# Patient Record
Sex: MALE | Race: WHITE | NOT HISPANIC OR LATINO | Employment: UNEMPLOYED | URBAN - METROPOLITAN AREA
[De-identification: names, ages, dates, MRNs, and addresses within clinical notes are randomized per-mention and may not be internally consistent; named-entity substitution may affect disease eponyms.]

---

## 2017-09-12 ENCOUNTER — TREATMENT (OUTPATIENT)
Dept: PHYSICAL THERAPY | Facility: CLINIC | Age: 37
End: 2017-09-12

## 2017-09-12 DIAGNOSIS — Z02.1 DRUG SCREENING, PRE-EMPLOYMENT: Primary | ICD-10-CM

## 2017-09-12 PROCEDURE — PDS: Performed by: PHYSICAL THERAPIST

## 2019-10-17 ENCOUNTER — HOSPITAL ENCOUNTER (OUTPATIENT)
Dept: OTHER | Facility: HOSPITAL | Age: 39
Discharge: HOME | End: 2019-10-17
Attending: NURSE PRACTITIONER

## 2019-10-17 LAB
CK MB CFR SERPL ELPH: 1.2 NG/ML (ref 1–3.8)
TROPONIN T SERPL-MCNC: <0.01 NG/ML (ref 0–0.1)

## 2020-04-16 ENCOUNTER — HOSPITAL ENCOUNTER (OUTPATIENT)
Dept: GENERAL RADIOLOGY | Facility: HOSPITAL | Age: 40
Discharge: HOME | End: 2020-04-16
Attending: INTERNAL MEDICINE

## 2020-04-16 ENCOUNTER — HOSPITAL ENCOUNTER (OUTPATIENT)
Dept: LAB | Facility: HOSPITAL | Age: 40
Discharge: HOME | End: 2020-04-16
Attending: INTERNAL MEDICINE

## 2020-04-16 LAB
ALBUMIN SERPL-MCNC: 4.2 G/DL (ref 3.5–5)
ALBUMIN/GLOB SERPL: 1.2 {RATIO} (ref 1.4–2.6)
ALP SERPL-CCNC: 74 U/L (ref 53–128)
ALT SERPL-CCNC: 145 U/L (ref 10–40)
ANION GAP SERPL CALC-SCNC: 16 MMOL/L (ref 8–19)
AST SERPL-CCNC: 109 U/L (ref 15–50)
BASOPHILS # BLD AUTO: 0.08 10*3/UL (ref 0–0.2)
BASOPHILS NFR BLD AUTO: 0.9 % (ref 0–3)
BILIRUB SERPL-MCNC: 0.42 MG/DL (ref 0.2–1.3)
BUN SERPL-MCNC: 11 MG/DL (ref 5–25)
BUN/CREAT SERPL: 10 {RATIO} (ref 6–20)
CALCIUM SERPL-MCNC: 9.3 MG/DL (ref 8.7–10.4)
CHLORIDE SERPL-SCNC: 103 MMOL/L (ref 99–111)
CONV ABS IMM GRAN: 0.18 10*3/UL (ref 0–0.2)
CONV CO2: 23 MMOL/L (ref 22–32)
CONV IMMATURE GRAN: 2 % (ref 0–1.8)
CONV TOTAL PROTEIN: 7.7 G/DL (ref 6.3–8.2)
CREAT UR-MCNC: 1.15 MG/DL (ref 0.7–1.2)
DEPRECATED RDW RBC AUTO: 43 FL (ref 35.1–43.9)
EOSINOPHIL # BLD AUTO: 0.18 10*3/UL (ref 0–0.7)
EOSINOPHIL # BLD AUTO: 2 % (ref 0–7)
ERYTHROCYTE [DISTWIDTH] IN BLOOD BY AUTOMATED COUNT: 12.3 % (ref 11.6–14.4)
ERYTHROCYTE [SEDIMENTATION RATE] IN BLOOD: 14 MM/H (ref 0–20)
FERRITIN SERPL-MCNC: 547 NG/ML (ref 30–300)
GFR SERPLBLD BASED ON 1.73 SQ M-ARVRAT: >60 ML/MIN/{1.73_M2}
GLOBULIN UR ELPH-MCNC: 3.5 G/DL (ref 2–3.5)
GLUCOSE SERPL-MCNC: 94 MG/DL (ref 70–99)
HCT VFR BLD AUTO: 45 % (ref 42–52)
HGB BLD-MCNC: 15.1 G/DL (ref 14–18)
LYMPHOCYTES # BLD AUTO: 2.14 10*3/UL (ref 1–5)
LYMPHOCYTES NFR BLD AUTO: 24.3 % (ref 20–45)
MCH RBC QN AUTO: 31.7 PG (ref 27–31)
MCHC RBC AUTO-ENTMCNC: 33.6 G/DL (ref 33–37)
MCV RBC AUTO: 94.3 FL (ref 80–96)
MONOCYTES # BLD AUTO: 1.06 10*3/UL (ref 0.2–1.2)
MONOCYTES NFR BLD AUTO: 12 % (ref 3–10)
NEUTROPHILS # BLD AUTO: 5.17 10*3/UL (ref 2–8)
NEUTROPHILS NFR BLD AUTO: 58.8 % (ref 30–85)
NRBC CBCN: 0 % (ref 0–0.7)
OSMOLALITY SERPL CALC.SUM OF ELEC: 285 MOSM/KG (ref 273–304)
PLATELET # BLD AUTO: 324 10*3/UL (ref 130–400)
PMV BLD AUTO: 10.8 FL (ref 9.4–12.4)
POTASSIUM SERPL-SCNC: 4 MMOL/L (ref 3.5–5.3)
RBC # BLD AUTO: 4.77 10*6/UL (ref 4.7–6.1)
SODIUM SERPL-SCNC: 138 MMOL/L (ref 135–147)
WBC # BLD AUTO: 8.81 10*3/UL (ref 4.8–10.8)

## 2020-04-20 LAB
ASO AB SERPL-ACNC: 189 [IU]/ML (ref 0–200)
CONV RHEUMATOID FACTOR IGM: <10 [IU]/ML (ref 0–14)
CRP SERPL-MCNC: 3.8 MG/L (ref 0–5)
DSDNA AB SER-ACNC: NEGATIVE [IU]/ML
ENA AB SER IA-ACNC: NEGATIVE {RATIO}
URATE SERPL-MCNC: 7.8 MG/DL (ref 3.5–8.5)

## 2020-04-23 LAB
CONV HLA-B27 GENOTYPING (PCR): NEGATIVE
CONV HLA-B27 PCR SPECIMEN: NORMAL

## 2020-10-13 ENCOUNTER — HOSPITAL ENCOUNTER (OUTPATIENT)
Dept: URGENT CARE | Facility: CLINIC | Age: 40
Discharge: HOME | End: 2020-10-13
Attending: FAMILY MEDICINE

## 2020-10-17 LAB — SARS-COV-2 RNA SPEC QL NAA+PROBE: NOT DETECTED

## 2020-10-21 ENCOUNTER — HOSPITAL ENCOUNTER (OUTPATIENT)
Dept: URGENT CARE | Facility: CLINIC | Age: 40
Discharge: HOME | End: 2020-10-21
Attending: FAMILY MEDICINE

## 2020-10-23 LAB — SARS-COV-2 RNA SPEC QL NAA+PROBE: NOT DETECTED

## 2021-01-01 ENCOUNTER — APPOINTMENT (OUTPATIENT)
Dept: INTERVENTIONAL RADIOLOGY/VASCULAR | Facility: HOSPITAL | Age: 41
DRG: 432 | End: 2021-01-01
Payer: MEDICAID

## 2021-01-01 ENCOUNTER — OFFICE VISIT (OUTPATIENT)
Dept: CARDIOLOGY | Facility: CLINIC | Age: 41
End: 2021-01-01

## 2021-01-01 ENCOUNTER — ANESTHESIA (OUTPATIENT)
Dept: GASTROENTEROLOGY | Facility: HOSPITAL | Age: 41
DRG: 432 | End: 2021-01-01
Payer: MEDICAID

## 2021-01-01 ENCOUNTER — CONVERSION ENCOUNTER (OUTPATIENT)
Dept: ORTHOPEDIC SURGERY | Facility: CLINIC | Age: 41
End: 2021-01-01

## 2021-01-01 ENCOUNTER — OFFICE VISIT CONVERTED (OUTPATIENT)
Dept: ORTHOPEDIC SURGERY | Facility: CLINIC | Age: 41
End: 2021-01-01
Attending: ORTHOPAEDIC SURGERY

## 2021-01-01 ENCOUNTER — APPOINTMENT (OUTPATIENT)
Dept: CT IMAGING | Facility: HOSPITAL | Age: 41
End: 2021-01-01

## 2021-01-01 ENCOUNTER — HOSPITAL ENCOUNTER (EMERGENCY)
Facility: HOSPITAL | Age: 41
Discharge: HOME | End: 2021-09-02
Attending: EMERGENCY MEDICINE | Admitting: EMERGENCY MEDICINE

## 2021-01-01 ENCOUNTER — APPOINTMENT (OUTPATIENT)
Dept: GENERAL RADIOLOGY | Facility: HOSPITAL | Age: 41
End: 2021-01-01

## 2021-01-01 ENCOUNTER — PREP FOR SURGERY (OUTPATIENT)
Dept: OTHER | Facility: HOSPITAL | Age: 41
End: 2021-01-01

## 2021-01-01 ENCOUNTER — APPOINTMENT (OUTPATIENT)
Dept: GENERAL RADIOLOGY | Facility: HOSPITAL | Age: 41
DRG: 432 | End: 2021-01-01
Payer: MEDICAID

## 2021-01-01 ENCOUNTER — APPOINTMENT (OUTPATIENT)
Dept: CARDIOLOGY | Facility: HOSPITAL | Age: 41
End: 2021-01-01

## 2021-01-01 ENCOUNTER — READMISSION MANAGEMENT (OUTPATIENT)
Dept: CALL CENTER | Facility: HOSPITAL | Age: 41
End: 2021-01-01

## 2021-01-01 ENCOUNTER — HOSPITAL ENCOUNTER (OUTPATIENT)
Dept: URGENT CARE | Facility: CLINIC | Age: 41
Discharge: HOME | End: 2021-04-18
Attending: PHYSICIAN ASSISTANT

## 2021-01-01 ENCOUNTER — HOSPITAL ENCOUNTER (INPATIENT)
Facility: HOSPITAL | Age: 41
LOS: 4 days | End: 2021-11-23
Attending: EMERGENCY MEDICINE | Admitting: HOSPITALIST

## 2021-01-01 ENCOUNTER — HOSPITAL ENCOUNTER (EMERGENCY)
Facility: HOSPITAL | Age: 41
Discharge: HOME | End: 2021-08-20
Attending: EMERGENCY MEDICINE | Admitting: EMERGENCY MEDICINE

## 2021-01-01 ENCOUNTER — ANESTHESIA EVENT (OUTPATIENT)
Dept: GASTROENTEROLOGY | Facility: HOSPITAL | Age: 41
DRG: 432 | End: 2021-01-01
Payer: MEDICAID

## 2021-01-01 ENCOUNTER — APPOINTMENT (OUTPATIENT)
Dept: NEUROLOGY | Facility: HOSPITAL | Age: 41
End: 2021-01-01

## 2021-01-01 ENCOUNTER — APPOINTMENT (OUTPATIENT)
Dept: CARDIOLOGY | Facility: HOSPITAL | Age: 41
DRG: 432 | End: 2021-01-01
Payer: MEDICAID

## 2021-01-01 ENCOUNTER — HOSPITAL ENCOUNTER (OUTPATIENT)
Dept: PREADMISSION TESTING | Facility: HOSPITAL | Age: 41
Discharge: HOME | End: 2021-04-01
Attending: ORTHOPAEDIC SURGERY

## 2021-01-01 ENCOUNTER — APPOINTMENT (OUTPATIENT)
Dept: ULTRASOUND IMAGING | Facility: HOSPITAL | Age: 41
End: 2021-01-01

## 2021-01-01 ENCOUNTER — APPOINTMENT (OUTPATIENT)
Dept: MRI IMAGING | Facility: HOSPITAL | Age: 41
DRG: 432 | End: 2021-01-01
Payer: MEDICAID

## 2021-01-01 ENCOUNTER — HOSPITAL ENCOUNTER (INPATIENT)
Facility: HOSPITAL | Age: 41
LOS: 4 days | Discharge: HOME | DRG: 432 | End: 2021-11-16
Attending: EMERGENCY MEDICINE | Admitting: INTERNAL MEDICINE
Payer: MEDICAID

## 2021-01-01 ENCOUNTER — APPOINTMENT (OUTPATIENT)
Dept: CT IMAGING | Facility: HOSPITAL | Age: 41
DRG: 432 | End: 2021-01-01
Payer: MEDICAID

## 2021-01-01 ENCOUNTER — APPOINTMENT (OUTPATIENT)
Dept: INTERVENTIONAL RADIOLOGY/VASCULAR | Facility: HOSPITAL | Age: 41
End: 2021-01-01

## 2021-01-01 ENCOUNTER — TRANSITIONAL CARE MANAGEMENT TELEPHONE ENCOUNTER (OUTPATIENT)
Dept: CALL CENTER | Facility: HOSPITAL | Age: 41
End: 2021-01-01

## 2021-01-01 VITALS
SYSTOLIC BLOOD PRESSURE: 140 MMHG | WEIGHT: 229.72 LBS | BODY MASS INDEX: 36.06 KG/M2 | RESPIRATION RATE: 18 BRPM | HEART RATE: 91 BPM | HEIGHT: 67 IN | OXYGEN SATURATION: 91 % | TEMPERATURE: 97.3 F | DIASTOLIC BLOOD PRESSURE: 95 MMHG

## 2021-01-01 VITALS
DIASTOLIC BLOOD PRESSURE: 71 MMHG | BODY MASS INDEX: 42.21 KG/M2 | HEIGHT: 67 IN | SYSTOLIC BLOOD PRESSURE: 156 MMHG | TEMPERATURE: 97.6 F | WEIGHT: 268.96 LBS

## 2021-01-01 VITALS
TEMPERATURE: 99.1 F | SYSTOLIC BLOOD PRESSURE: 146 MMHG | BODY MASS INDEX: 37.02 KG/M2 | OXYGEN SATURATION: 98 % | HEART RATE: 90 BPM | HEIGHT: 67 IN | WEIGHT: 235.89 LBS | RESPIRATION RATE: 18 BRPM | DIASTOLIC BLOOD PRESSURE: 90 MMHG

## 2021-01-01 VITALS
SYSTOLIC BLOOD PRESSURE: 149 MMHG | BODY MASS INDEX: 35.16 KG/M2 | HEIGHT: 67 IN | DIASTOLIC BLOOD PRESSURE: 94 MMHG | WEIGHT: 224 LBS | HEART RATE: 119 BPM

## 2021-01-01 VITALS — BODY MASS INDEX: 31.7 KG/M2 | HEIGHT: 69 IN | WEIGHT: 214 LBS

## 2021-01-01 VITALS
HEART RATE: 110 BPM | HEIGHT: 67 IN | SYSTOLIC BLOOD PRESSURE: 108 MMHG | TEMPERATURE: 99 F | BODY MASS INDEX: 41.97 KG/M2 | RESPIRATION RATE: 20 BRPM | WEIGHT: 267.42 LBS | OXYGEN SATURATION: 96 % | DIASTOLIC BLOOD PRESSURE: 88 MMHG

## 2021-01-01 VITALS — HEIGHT: 69 IN | WEIGHT: 214 LBS | BODY MASS INDEX: 31.7 KG/M2

## 2021-01-01 VITALS — OXYGEN SATURATION: 98 % | BODY MASS INDEX: 31.75 KG/M2 | WEIGHT: 214.37 LBS | HEIGHT: 69 IN | HEART RATE: 98 BPM

## 2021-01-01 DIAGNOSIS — R41.0 DELIRIUM: ICD-10-CM

## 2021-01-01 DIAGNOSIS — R19.7 DIARRHEA, UNSPECIFIED TYPE: ICD-10-CM

## 2021-01-01 DIAGNOSIS — I42.9 CARDIOMYOPATHY, UNSPECIFIED TYPE (HCC): Primary | ICD-10-CM

## 2021-01-01 DIAGNOSIS — K70.10 ALCOHOLIC HEPATITIS WITHOUT ASCITES: Primary | ICD-10-CM

## 2021-01-01 DIAGNOSIS — R10.9 ABDOMINAL PAIN, UNSPECIFIED ABDOMINAL LOCATION: ICD-10-CM

## 2021-01-01 DIAGNOSIS — K70.10 ALCOHOLIC HEPATITIS WITHOUT ASCITES: ICD-10-CM

## 2021-01-01 DIAGNOSIS — K76.9 LIVER DISEASE: ICD-10-CM

## 2021-01-01 DIAGNOSIS — N50.89 SCROTAL EDEMA: Primary | ICD-10-CM

## 2021-01-01 DIAGNOSIS — N17.9 ACUTE RENAL FAILURE, UNSPECIFIED ACUTE RENAL FAILURE TYPE: Primary | ICD-10-CM

## 2021-01-01 DIAGNOSIS — I86.1 VARICOCELE PRESENT ON ULTRASOUND OF SCROTUM: ICD-10-CM

## 2021-01-01 DIAGNOSIS — F10.20 ALCOHOLISM: ICD-10-CM

## 2021-01-01 DIAGNOSIS — I10 ESSENTIAL HYPERTENSION: ICD-10-CM

## 2021-01-01 DIAGNOSIS — N43.3 HYDROCELE, UNSPECIFIED HYDROCELE TYPE: ICD-10-CM

## 2021-01-01 DIAGNOSIS — R10.9 RIGHT FLANK PAIN: ICD-10-CM

## 2021-01-01 DIAGNOSIS — N39.0 URINARY TRACT INFECTION IN MALE: Primary | ICD-10-CM

## 2021-01-01 DIAGNOSIS — R60.1 ANASARCA: ICD-10-CM

## 2021-01-01 DIAGNOSIS — E66.9 OBESITY (BMI 35.0-39.9 WITHOUT COMORBIDITY): ICD-10-CM

## 2021-01-01 DIAGNOSIS — J18.9 MULTIFOCAL PNEUMONIA: ICD-10-CM

## 2021-01-01 DIAGNOSIS — J96.01 ACUTE RESPIRATORY FAILURE WITH HYPOXIA: Primary | ICD-10-CM

## 2021-01-01 LAB
ALBUMIN SERPL-MCNC: 2.7 G/DL (ref 3.5–5.2)
ALBUMIN SERPL-MCNC: 2.8 G/DL (ref 3.5–5.2)
ALBUMIN SERPL-MCNC: 2.9 G/DL (ref 3.5–5.2)
ALBUMIN SERPL-MCNC: 2.9 G/DL (ref 3.5–5.2)
ALBUMIN SERPL-MCNC: 3 G/DL (ref 3.5–5.2)
ALBUMIN SERPL-MCNC: 3 G/DL (ref 3.5–5.2)
ALBUMIN SERPL-MCNC: 3.6 G/DL (ref 3.5–5.2)
ALBUMIN/GLOB SERPL: 0.5 G/DL
ALBUMIN/GLOB SERPL: 0.6 G/DL
ALBUMIN/GLOB SERPL: 0.8 G/DL
ALP SERPL-CCNC: 179 U/L (ref 39–117)
ALP SERPL-CCNC: 190 U/L (ref 39–117)
ALP SERPL-CCNC: 194 U/L (ref 39–117)
ALP SERPL-CCNC: 201 U/L (ref 39–117)
ALP SERPL-CCNC: 201 U/L (ref 39–117)
ALP SERPL-CCNC: 216 U/L (ref 39–117)
ALP SERPL-CCNC: 225 U/L (ref 39–117)
ALP SERPL-CCNC: 230 U/L (ref 39–117)
ALP SERPL-CCNC: 236 U/L (ref 39–117)
ALP SERPL-CCNC: 249 U/L (ref 39–117)
ALP SERPL-CCNC: 269 U/L (ref 39–117)
ALP SERPL-CCNC: 305 U/L (ref 39–117)
ALT SERPL W P-5'-P-CCNC: 20 U/L (ref 1–41)
ALT SERPL W P-5'-P-CCNC: 21 U/L (ref 1–41)
ALT SERPL W P-5'-P-CCNC: 22 U/L (ref 1–41)
ALT SERPL W P-5'-P-CCNC: 23 U/L (ref 1–41)
ALT SERPL W P-5'-P-CCNC: 23 U/L (ref 1–41)
ALT SERPL W P-5'-P-CCNC: 25 U/L (ref 1–41)
ALT SERPL W P-5'-P-CCNC: 27 U/L (ref 1–41)
ALT SERPL W P-5'-P-CCNC: 28 U/L (ref 1–41)
ALT SERPL W P-5'-P-CCNC: 29 U/L (ref 1–41)
ALT SERPL W P-5'-P-CCNC: 45 U/L (ref 1–41)
AMMONIA BLD-SCNC: 106 UMOL/L (ref 16–60)
AMMONIA BLD-SCNC: 22 UMOL/L (ref 16–60)
AMMONIA BLD-SCNC: 57 UMOL/L (ref 16–60)
AMMONIA BLD-SCNC: 91 UMOL/L (ref 16–60)
AMPHET+METHAMPHET UR QL: NEGATIVE
ANION GAP SERPL CALCULATED.3IONS-SCNC: 10.4 MMOL/L (ref 5–15)
ANION GAP SERPL CALCULATED.3IONS-SCNC: 10.7 MMOL/L (ref 5–15)
ANION GAP SERPL CALCULATED.3IONS-SCNC: 10.8 MMOL/L (ref 5–15)
ANION GAP SERPL CALCULATED.3IONS-SCNC: 11 MMOL/L (ref 5–15)
ANION GAP SERPL CALCULATED.3IONS-SCNC: 11.2 MMOL/L (ref 5–15)
ANION GAP SERPL CALCULATED.3IONS-SCNC: 12.1 MMOL/L (ref 5–15)
ANION GAP SERPL CALCULATED.3IONS-SCNC: 12.3 MMOL/L (ref 5–15)
ANION GAP SERPL CALCULATED.3IONS-SCNC: 13.4 MMOL/L (ref 5–15)
ANION GAP SERPL CALCULATED.3IONS-SCNC: 13.7 MMOL/L (ref 5–15)
ANION GAP SERPL CALCULATED.3IONS-SCNC: 14 MMOL/L (ref 5–15)
ANION GAP SERPL CALCULATED.3IONS-SCNC: 15.4 MMOL/L (ref 5–15)
ANION GAP SERPL CALCULATED.3IONS-SCNC: 17.2 MMOL/L (ref 5–15)
ANION GAP SERPL CALCULATED.3IONS-SCNC: 19.1 MMOL/L (ref 5–15)
ANION GAP SERPL CALCULATED.3IONS-SCNC: 19.5 MMOL/L (ref 5–15)
ANISOCYTOSIS BLD QL: ABNORMAL
ANISOCYTOSIS BLD QL: NORMAL
ARTERIAL PATENCY WRIST A: ABNORMAL
ARTERIAL PATENCY WRIST A: ABNORMAL
ARTERIAL PATENCY WRIST A: POSITIVE
AST SERPL-CCNC: 141 U/L (ref 1–40)
AST SERPL-CCNC: 152 U/L (ref 1–40)
AST SERPL-CCNC: 160 U/L (ref 1–40)
AST SERPL-CCNC: 173 U/L (ref 1–40)
AST SERPL-CCNC: 173 U/L (ref 1–40)
AST SERPL-CCNC: 182 U/L (ref 1–40)
AST SERPL-CCNC: 182 U/L (ref 1–40)
AST SERPL-CCNC: 185 U/L (ref 1–40)
AST SERPL-CCNC: 185 U/L (ref 1–40)
AST SERPL-CCNC: 187 U/L (ref 1–40)
AST SERPL-CCNC: 189 U/L (ref 1–40)
AST SERPL-CCNC: 207 U/L (ref 1–40)
BACTERIA SPEC AEROBE CULT: NORMAL
BACTERIA SPEC AEROBE CULT: NORMAL
BACTERIA UR QL AUTO: ABNORMAL /HPF
BACTERIA UR QL AUTO: ABNORMAL /HPF
BARBITURATES UR QL SCN: NEGATIVE
BASE EXCESS BLDA CALC-SCNC: -1.9 MMOL/L (ref -2–2)
BASE EXCESS BLDA CALC-SCNC: -3.3 MMOL/L (ref -2–2)
BASE EXCESS BLDA CALC-SCNC: -8.3 MMOL/L (ref -2–2)
BASOPHILS # BLD AUTO: 0.03 10*3/MM3 (ref 0–0.2)
BASOPHILS # BLD AUTO: 0.04 10*3/MM3 (ref 0–0.2)
BASOPHILS # BLD AUTO: 0.04 10*3/MM3 (ref 0–0.2)
BASOPHILS # BLD AUTO: 0.05 10*3/MM3 (ref 0–0.2)
BASOPHILS # BLD AUTO: 0.05 10*3/MM3 (ref 0–0.2)
BASOPHILS # BLD AUTO: 0.06 10*3/MM3 (ref 0–0.2)
BASOPHILS # BLD AUTO: 0.06 10*3/MM3 (ref 0–0.2)
BASOPHILS # BLD AUTO: 0.07 10*3/MM3 (ref 0–0.2)
BASOPHILS NFR BLD AUTO: 0.3 % (ref 0–1.5)
BASOPHILS NFR BLD AUTO: 0.3 % (ref 0–1.5)
BASOPHILS NFR BLD AUTO: 0.4 % (ref 0–1.5)
BASOPHILS NFR BLD AUTO: 0.4 % (ref 0–1.5)
BASOPHILS NFR BLD AUTO: 0.5 % (ref 0–1.5)
BASOPHILS NFR BLD AUTO: 0.6 % (ref 0–1.5)
BASOPHILS NFR BLD AUTO: 0.7 % (ref 0–1.5)
BDY SITE: ABNORMAL
BENZODIAZ UR QL SCN: NEGATIVE
BH CV ECHO MEAS - AO ROOT DIAM: 3.7 CM
BH CV ECHO MEAS - EDV(MOD-SP2): 124 ML
BH CV ECHO MEAS - EDV(MOD-SP2): 94 ML
BH CV ECHO MEAS - EDV(MOD-SP4): 122 ML
BH CV ECHO MEAS - EDV(MOD-SP4): 95 ML
BH CV ECHO MEAS - EF(MOD-BP): 61.3 %
BH CV ECHO MEAS - EF(MOD-BP): 64.6 %
BH CV ECHO MEAS - ESV(MOD-SP2): 37 ML
BH CV ECHO MEAS - ESV(MOD-SP2): 44 ML
BH CV ECHO MEAS - ESV(MOD-SP4): 3 ML
BH CV ECHO MEAS - ESV(MOD-SP4): 37 ML
BH CV ECHO MEAS - IVSD: 0.9 CM
BH CV ECHO MEAS - IVSD: 1 CM
BH CV ECHO MEAS - LA DIMENSION(2D): 2.7 CM
BH CV ECHO MEAS - LA DIMENSION(2D): 3.8 CM
BH CV ECHO MEAS - LAT PEAK E' VEL: 11.9 CM/SEC
BH CV ECHO MEAS - LAT PEAK E' VEL: 12 CM/SEC
BH CV ECHO MEAS - LVIDD: 5.8 CM
BH CV ECHO MEAS - LVIDD: 5.8 CM
BH CV ECHO MEAS - LVIDS: 3.4 CM
BH CV ECHO MEAS - LVIDS: 3.6 CM
BH CV ECHO MEAS - LVOT DIAM: 2 CM
BH CV ECHO MEAS - LVOT DIAM: 2 CM
BH CV ECHO MEAS - LVPWD: 0.9 CM
BH CV ECHO MEAS - LVPWD: 1.1 CM
BH CV ECHO MEAS - MED PEAK E' VEL: 10.6 CM/SEC
BH CV ECHO MEAS - MED PEAK E' VEL: 7.83 CM/SEC
BH CV ECHO MEAS - MV A MAX VEL: 102 CM/SEC
BH CV ECHO MEAS - MV A MAX VEL: 56 CM/SEC
BH CV ECHO MEAS - MV DEC TIME: 137 MSEC
BH CV ECHO MEAS - MV DEC TIME: 85 MSEC
BH CV ECHO MEAS - MV E MAX VEL: 121 CM/SEC
BH CV ECHO MEAS - MV E MAX VEL: 98 CM/SEC
BH CV ECHO MEAS - MV E/A: 1.2
BH CV ECHO MEAS - MV E/A: 1.8
BH CV ECHO MEAS - RVDD: 2.8 CM
BH CV ECHO MEAS - RVDD: 2.9 CM
BH CV ECHO MEAS - TR MAX PG: 20 MMHG
BH CV ECHO MEASUREMENTS AVERAGE E/E' RATIO: 10.76
BH CV ECHO MEASUREMENTS AVERAGE E/E' RATIO: 9.88
BILIRUB CONJ SERPL-MCNC: 3.1 MG/DL (ref 0–0.3)
BILIRUB INDIRECT SERPL-MCNC: 1.1 MG/DL
BILIRUB SERPL-MCNC: 3 MG/DL (ref 0–1.2)
BILIRUB SERPL-MCNC: 3.9 MG/DL (ref 0–1.2)
BILIRUB SERPL-MCNC: 4.2 MG/DL (ref 0–1.2)
BILIRUB SERPL-MCNC: 4.6 MG/DL (ref 0–1.2)
BILIRUB SERPL-MCNC: 5.1 MG/DL (ref 0–1.2)
BILIRUB SERPL-MCNC: 5.2 MG/DL (ref 0–1.2)
BILIRUB SERPL-MCNC: 5.3 MG/DL (ref 0–1.2)
BILIRUB SERPL-MCNC: 5.7 MG/DL (ref 0–1.2)
BILIRUB SERPL-MCNC: 5.9 MG/DL (ref 0–1.2)
BILIRUB SERPL-MCNC: 6 MG/DL (ref 0–1.2)
BILIRUB SERPL-MCNC: 6 MG/DL (ref 0–1.2)
BILIRUB SERPL-MCNC: 6.7 MG/DL (ref 0–1.2)
BILIRUB UR QL STRIP: ABNORMAL
BILIRUB UR QL STRIP: ABNORMAL
BILIRUB UR QL STRIP: NEGATIVE
BILIRUB UR QL STRIP: NEGATIVE
BUN SERPL-MCNC: 10 MG/DL (ref 6–20)
BUN SERPL-MCNC: 12 MG/DL (ref 6–20)
BUN SERPL-MCNC: 12 MG/DL (ref 6–20)
BUN SERPL-MCNC: 15 MG/DL (ref 6–20)
BUN SERPL-MCNC: 34 MG/DL (ref 6–20)
BUN SERPL-MCNC: 39 MG/DL (ref 6–20)
BUN SERPL-MCNC: 45 MG/DL (ref 6–20)
BUN SERPL-MCNC: 51 MG/DL (ref 6–20)
BUN SERPL-MCNC: 52 MG/DL (ref 6–20)
BUN SERPL-MCNC: 58 MG/DL (ref 6–20)
BUN SERPL-MCNC: 63 MG/DL (ref 6–20)
BUN SERPL-MCNC: 74 MG/DL (ref 6–20)
BUN SERPL-MCNC: 8 MG/DL (ref 6–20)
BUN SERPL-MCNC: 9 MG/DL (ref 6–20)
BUN/CREAT SERPL: 11.1 (ref 7–25)
BUN/CREAT SERPL: 11.8 (ref 7–25)
BUN/CREAT SERPL: 12.1 (ref 7–25)
BUN/CREAT SERPL: 12.4 (ref 7–25)
BUN/CREAT SERPL: 12.7 (ref 7–25)
BUN/CREAT SERPL: 13.6 (ref 7–25)
BUN/CREAT SERPL: 14 (ref 7–25)
BUN/CREAT SERPL: 14.1 (ref 7–25)
BUN/CREAT SERPL: 14.7 (ref 7–25)
BUN/CREAT SERPL: 15.8 (ref 7–25)
BUN/CREAT SERPL: 16 (ref 7–25)
BUN/CREAT SERPL: 16.1 (ref 7–25)
BUN/CREAT SERPL: 19.2 (ref 7–25)
BUN/CREAT SERPL: 20.6 (ref 7–25)
C TRACH RRNA CVX QL NAA+PROBE: NOT DETECTED
CA-I BLDA-SCNC: 0.99 MMOL/L (ref 1.13–1.32)
CA-I BLDA-SCNC: 1.01 MMOL/L (ref 1.13–1.32)
CALCIUM SPEC-SCNC: 6.5 MG/DL (ref 8.6–10.5)
CALCIUM SPEC-SCNC: 6.7 MG/DL (ref 8.6–10.5)
CALCIUM SPEC-SCNC: 6.8 MG/DL (ref 8.6–10.5)
CALCIUM SPEC-SCNC: 7.1 MG/DL (ref 8.6–10.5)
CALCIUM SPEC-SCNC: 7.2 MG/DL (ref 8.6–10.5)
CALCIUM SPEC-SCNC: 7.3 MG/DL (ref 8.6–10.5)
CALCIUM SPEC-SCNC: 7.6 MG/DL (ref 8.6–10.5)
CALCIUM SPEC-SCNC: 7.8 MG/DL (ref 8.6–10.5)
CALCIUM SPEC-SCNC: 7.8 MG/DL (ref 8.6–10.5)
CALCIUM SPEC-SCNC: 7.9 MG/DL (ref 8.6–10.5)
CALCIUM SPEC-SCNC: 8 MG/DL (ref 8.6–10.5)
CALCIUM SPEC-SCNC: 8.6 MG/DL (ref 8.6–10.5)
CANNABINOIDS SERPL QL: NEGATIVE
CHLORIDE BLDA-SCNC: 102 MMOL/L (ref 98–106)
CHLORIDE BLDA-SCNC: 99 MMOL/L (ref 98–106)
CHLORIDE SERPL-SCNC: 102 MMOL/L (ref 98–107)
CHLORIDE SERPL-SCNC: 89 MMOL/L (ref 98–107)
CHLORIDE SERPL-SCNC: 92 MMOL/L (ref 98–107)
CHLORIDE SERPL-SCNC: 93 MMOL/L (ref 98–107)
CHLORIDE SERPL-SCNC: 94 MMOL/L (ref 98–107)
CHLORIDE SERPL-SCNC: 94 MMOL/L (ref 98–107)
CHLORIDE SERPL-SCNC: 95 MMOL/L (ref 98–107)
CHLORIDE SERPL-SCNC: 97 MMOL/L (ref 98–107)
CHLORIDE SERPL-SCNC: 98 MMOL/L (ref 98–107)
CHLORIDE SERPL-SCNC: 98 MMOL/L (ref 98–107)
CHLORIDE SERPL-SCNC: 99 MMOL/L (ref 98–107)
CK SERPL-CCNC: 72 U/L (ref 20–200)
CLARITY UR: ABNORMAL
CLARITY UR: CLEAR
CO2 SERPL-SCNC: 18.5 MMOL/L (ref 22–29)
CO2 SERPL-SCNC: 20.9 MMOL/L (ref 22–29)
CO2 SERPL-SCNC: 21.6 MMOL/L (ref 22–29)
CO2 SERPL-SCNC: 22 MMOL/L (ref 22–29)
CO2 SERPL-SCNC: 23.6 MMOL/L (ref 22–29)
CO2 SERPL-SCNC: 23.7 MMOL/L (ref 22–29)
CO2 SERPL-SCNC: 24.3 MMOL/L (ref 22–29)
CO2 SERPL-SCNC: 25.6 MMOL/L (ref 22–29)
CO2 SERPL-SCNC: 25.8 MMOL/L (ref 22–29)
CO2 SERPL-SCNC: 26.2 MMOL/L (ref 22–29)
CO2 SERPL-SCNC: 26.3 MMOL/L (ref 22–29)
CO2 SERPL-SCNC: 26.9 MMOL/L (ref 22–29)
CO2 SERPL-SCNC: 27 MMOL/L (ref 22–29)
CO2 SERPL-SCNC: 27.8 MMOL/L (ref 22–29)
COCAINE UR QL: NEGATIVE
COHGB MFR BLD: 0.4 % (ref 0–1.5)
COHGB MFR BLD: 1.1 % (ref 0–1.5)
COHGB MFR BLD: 1.3 % (ref 0–1.5)
COLOR UR: ABNORMAL
COLOR UR: YELLOW
CREAT SERPL-MCNC: 0.57 MG/DL (ref 0.76–1.27)
CREAT SERPL-MCNC: 0.66 MG/DL (ref 0.76–1.27)
CREAT SERPL-MCNC: 0.68 MG/DL (ref 0.76–1.27)
CREAT SERPL-MCNC: 0.85 MG/DL (ref 0.76–1.27)
CREAT SERPL-MCNC: 0.93 MG/DL (ref 0.76–1.27)
CREAT SERPL-MCNC: 0.99 MG/DL (ref 0.76–1.27)
CREAT SERPL-MCNC: 1.65 MG/DL (ref 0.76–1.27)
CREAT SERPL-MCNC: 2.03 MG/DL (ref 0.76–1.27)
CREAT SERPL-MCNC: 2.85 MG/DL (ref 0.76–1.27)
CREAT SERPL-MCNC: 3.24 MG/DL (ref 0.76–1.27)
CREAT SERPL-MCNC: 4.57 MG/DL (ref 0.76–1.27)
CREAT SERPL-MCNC: 4.6 MG/DL (ref 0.76–1.27)
CREAT SERPL-MCNC: 5.33 MG/DL (ref 0.76–1.27)
CREAT SERPL-MCNC: 5.99 MG/DL (ref 0.76–1.27)
CREAT UR-MCNC: 185.3 MG/DL
CREAT UR-MCNC: 387.2 MG/DL
CYTO UR: NORMAL
D-LACTATE SERPL-SCNC: 1.5 MMOL/L (ref 0.5–2)
D-LACTATE SERPL-SCNC: 1.6 MMOL/L (ref 0.5–2)
D-LACTATE SERPL-SCNC: 2.3 MMOL/L (ref 0.5–2)
D-LACTATE SERPL-SCNC: 2.6 MMOL/L (ref 0.5–2)
DACRYOCYTES BLD QL SMEAR: NORMAL
DEPRECATED RDW RBC AUTO: 58.2 FL (ref 37–54)
DEPRECATED RDW RBC AUTO: 79.9 FL (ref 37–54)
DEPRECATED RDW RBC AUTO: 80 FL (ref 37–54)
DEPRECATED RDW RBC AUTO: 81 FL (ref 37–54)
DEPRECATED RDW RBC AUTO: 81.9 FL (ref 37–54)
DEPRECATED RDW RBC AUTO: 82.1 FL (ref 37–54)
DEPRECATED RDW RBC AUTO: 83.1 FL (ref 37–54)
DEPRECATED RDW RBC AUTO: 83.7 FL (ref 37–54)
DEPRECATED RDW RBC AUTO: 85 FL (ref 37–54)
DEPRECATED RDW RBC AUTO: 85.5 FL (ref 37–54)
DEPRECATED RDW RBC AUTO: 86.4 FL (ref 37–54)
DEPRECATED RDW RBC AUTO: 90.1 FL (ref 37–54)
EOSINOPHIL # BLD AUTO: 0.03 10*3/MM3 (ref 0–0.4)
EOSINOPHIL # BLD AUTO: 0.04 10*3/MM3 (ref 0–0.4)
EOSINOPHIL # BLD AUTO: 0.05 10*3/MM3 (ref 0–0.4)
EOSINOPHIL # BLD AUTO: 0.06 10*3/MM3 (ref 0–0.4)
EOSINOPHIL # BLD AUTO: 0.06 10*3/MM3 (ref 0–0.4)
EOSINOPHIL # BLD AUTO: 0.07 10*3/MM3 (ref 0–0.4)
EOSINOPHIL # BLD AUTO: 0.08 10*3/MM3 (ref 0–0.4)
EOSINOPHIL # BLD AUTO: 0.09 10*3/MM3 (ref 0–0.4)
EOSINOPHIL # BLD AUTO: 0.12 10*3/MM3 (ref 0–0.4)
EOSINOPHIL # BLD AUTO: 0.13 10*3/MM3 (ref 0–0.4)
EOSINOPHIL # BLD MANUAL: 0.15 10*3/MM3 (ref 0–0.4)
EOSINOPHIL NFR BLD AUTO: 0.2 % (ref 0.3–6.2)
EOSINOPHIL NFR BLD AUTO: 0.4 % (ref 0.3–6.2)
EOSINOPHIL NFR BLD AUTO: 0.5 % (ref 0.3–6.2)
EOSINOPHIL NFR BLD AUTO: 0.7 % (ref 0.3–6.2)
EOSINOPHIL NFR BLD AUTO: 0.8 % (ref 0.3–6.2)
EOSINOPHIL NFR BLD AUTO: 1 % (ref 0.3–6.2)
EOSINOPHIL NFR BLD AUTO: 1.4 % (ref 0.3–6.2)
EOSINOPHIL NFR BLD AUTO: 1.8 % (ref 0.3–6.2)
EOSINOPHIL NFR BLD MANUAL: 1 % (ref 0.3–6.2)
ERYTHROCYTE [DISTWIDTH] IN BLOOD BY AUTOMATED COUNT: 16.5 % (ref 12.3–15.4)
ERYTHROCYTE [DISTWIDTH] IN BLOOD BY AUTOMATED COUNT: 21.2 % (ref 12.3–15.4)
ERYTHROCYTE [DISTWIDTH] IN BLOOD BY AUTOMATED COUNT: 21.2 % (ref 12.3–15.4)
ERYTHROCYTE [DISTWIDTH] IN BLOOD BY AUTOMATED COUNT: 21.6 % (ref 12.3–15.4)
ERYTHROCYTE [DISTWIDTH] IN BLOOD BY AUTOMATED COUNT: 22 % (ref 12.3–15.4)
ERYTHROCYTE [DISTWIDTH] IN BLOOD BY AUTOMATED COUNT: 22.3 % (ref 12.3–15.4)
ERYTHROCYTE [DISTWIDTH] IN BLOOD BY AUTOMATED COUNT: 22.6 % (ref 12.3–15.4)
ERYTHROCYTE [DISTWIDTH] IN BLOOD BY AUTOMATED COUNT: 23.7 % (ref 12.3–15.4)
ERYTHROCYTE [DISTWIDTH] IN BLOOD BY AUTOMATED COUNT: 23.9 % (ref 12.3–15.4)
ERYTHROCYTE [DISTWIDTH] IN BLOOD BY AUTOMATED COUNT: 24.1 % (ref 12.3–15.4)
ERYTHROCYTE [DISTWIDTH] IN BLOOD BY AUTOMATED COUNT: 24.8 % (ref 12.3–15.4)
ERYTHROCYTE [DISTWIDTH] IN BLOOD BY AUTOMATED COUNT: 25.2 % (ref 12.3–15.4)
ETHANOL BLD-MCNC: <10 MG/DL (ref 0–10)
ETHANOL UR QL: <0.01 %
FHHB: 6 % (ref 0–5)
FHHB: 7.8 % (ref 0–5)
FHHB: 8.3 % (ref 0–5)
GAS FLOW AIRWAY: 2 LPM
GAS FLOW AIRWAY: ABNORMAL L/MIN
GAS FLOW AIRWAY: ABNORMAL L/MIN
GFR SERPL CREATININE-BSD FRML MDRD: 10 ML/MIN/1.73
GFR SERPL CREATININE-BSD FRML MDRD: 12 ML/MIN/1.73
GFR SERPL CREATININE-BSD FRML MDRD: 127 ML/MIN/1.73
GFR SERPL CREATININE-BSD FRML MDRD: 132 ML/MIN/1.73
GFR SERPL CREATININE-BSD FRML MDRD: 14 ML/MIN/1.73
GFR SERPL CREATININE-BSD FRML MDRD: 14 ML/MIN/1.73
GFR SERPL CREATININE-BSD FRML MDRD: 21 ML/MIN/1.73
GFR SERPL CREATININE-BSD FRML MDRD: 24 ML/MIN/1.73
GFR SERPL CREATININE-BSD FRML MDRD: 36 ML/MIN/1.73
GFR SERPL CREATININE-BSD FRML MDRD: 46 ML/MIN/1.73
GFR SERPL CREATININE-BSD FRML MDRD: 83 ML/MIN/1.73
GFR SERPL CREATININE-BSD FRML MDRD: 89 ML/MIN/1.73
GFR SERPL CREATININE-BSD FRML MDRD: 98 ML/MIN/1.73
GFR SERPL CREATININE-BSD FRML MDRD: >150 ML/MIN/1.73
GFR SERPL CREATININE-BSD FRML MDRD: ABNORMAL ML/MIN/{1.73_M2}
GLOBULIN UR ELPH-MCNC: 4.5 GM/DL
GLOBULIN UR ELPH-MCNC: 4.6 GM/DL
GLOBULIN UR ELPH-MCNC: 4.9 GM/DL
GLOBULIN UR ELPH-MCNC: 4.9 GM/DL
GLOBULIN UR ELPH-MCNC: 5 GM/DL
GLOBULIN UR ELPH-MCNC: 5.1 GM/DL
GLOBULIN UR ELPH-MCNC: 5.3 GM/DL
GLOBULIN UR ELPH-MCNC: 5.5 GM/DL
GLUCOSE BLDA-MCNC: 119 MMOL/L (ref 70–99)
GLUCOSE BLDA-MCNC: 172 MMOL/L (ref 70–99)
GLUCOSE BLDC GLUCOMTR-MCNC: 120 MG/DL (ref 70–99)
GLUCOSE SERPL-MCNC: 104 MG/DL (ref 65–99)
GLUCOSE SERPL-MCNC: 105 MG/DL (ref 65–99)
GLUCOSE SERPL-MCNC: 126 MG/DL (ref 65–99)
GLUCOSE SERPL-MCNC: 126 MG/DL (ref 65–99)
GLUCOSE SERPL-MCNC: 127 MG/DL (ref 65–99)
GLUCOSE SERPL-MCNC: 130 MG/DL (ref 65–99)
GLUCOSE SERPL-MCNC: 134 MG/DL (ref 65–99)
GLUCOSE SERPL-MCNC: 136 MG/DL (ref 65–99)
GLUCOSE SERPL-MCNC: 145 MG/DL (ref 65–99)
GLUCOSE SERPL-MCNC: 147 MG/DL (ref 65–99)
GLUCOSE SERPL-MCNC: 147 MG/DL (ref 65–99)
GLUCOSE SERPL-MCNC: 154 MG/DL (ref 65–99)
GLUCOSE SERPL-MCNC: 164 MG/DL (ref 65–99)
GLUCOSE SERPL-MCNC: 194 MG/DL (ref 65–99)
GLUCOSE UR STRIP-MCNC: NEGATIVE MG/DL
HAV IGM SERPL QL IA: NORMAL
HBV CORE IGM SERPL QL IA: NORMAL
HBV SURFACE AG SERPL QL IA: NORMAL
HCO3 BLDA-SCNC: 19.5 MMOL/L (ref 22–26)
HCO3 BLDA-SCNC: 21.8 MMOL/L (ref 22–26)
HCO3 BLDA-SCNC: 21.9 MMOL/L (ref 22–26)
HCT VFR BLD AUTO: 22.9 % (ref 37.5–51)
HCT VFR BLD AUTO: 23.1 % (ref 37.5–51)
HCT VFR BLD AUTO: 23.6 % (ref 37.5–51)
HCT VFR BLD AUTO: 23.7 % (ref 37.5–51)
HCT VFR BLD AUTO: 24.1 % (ref 37.5–51)
HCT VFR BLD AUTO: 24.2 % (ref 37.5–51)
HCT VFR BLD AUTO: 24.3 % (ref 37.5–51)
HCT VFR BLD AUTO: 24.6 % (ref 37.5–51)
HCT VFR BLD AUTO: 25 % (ref 37.5–51)
HCT VFR BLD AUTO: 25.6 % (ref 37.5–51)
HCT VFR BLD AUTO: 26.1 % (ref 37.5–51)
HCT VFR BLD AUTO: 34.8 % (ref 37.5–51)
HCV AB SER DONR QL: NORMAL
HGB BLD-MCNC: 11.7 G/DL (ref 13–17.7)
HGB BLD-MCNC: 7.9 G/DL (ref 13–17.7)
HGB BLD-MCNC: 8 G/DL (ref 13–17.7)
HGB BLD-MCNC: 8.1 G/DL (ref 13–17.7)
HGB BLD-MCNC: 8.3 G/DL (ref 13–17.7)
HGB BLD-MCNC: 8.3 G/DL (ref 13–17.7)
HGB BLD-MCNC: 8.4 G/DL (ref 13–17.7)
HGB BLD-MCNC: 8.6 G/DL (ref 13–17.7)
HGB BLD-MCNC: 8.9 G/DL (ref 13–17.7)
HGB BLD-MCNC: 9.2 G/DL (ref 13–17.7)
HGB BLDA-MCNC: 8.3 G/DL (ref 13.8–16.4)
HGB BLDA-MCNC: 8.6 G/DL (ref 13.8–16.4)
HGB BLDA-MCNC: 8.8 G/DL (ref 13.8–16.4)
HGB UR QL STRIP.AUTO: ABNORMAL
HGB UR QL STRIP.AUTO: ABNORMAL
HGB UR QL STRIP.AUTO: NEGATIVE
HGB UR QL STRIP.AUTO: NEGATIVE
HOLD SPECIMEN: NORMAL
HYALINE CASTS UR QL AUTO: ABNORMAL /LPF
HYALINE CASTS UR QL AUTO: ABNORMAL /LPF
HYPOCHROMIA BLD QL: ABNORMAL
HYPOCHROMIA BLD QL: NORMAL
HYPOCHROMIA BLD QL: NORMAL
IMM GRANULOCYTES # BLD AUTO: 0.07 10*3/MM3 (ref 0–0.05)
IMM GRANULOCYTES # BLD AUTO: 0.07 10*3/MM3 (ref 0–0.05)
IMM GRANULOCYTES # BLD AUTO: 0.08 10*3/MM3 (ref 0–0.05)
IMM GRANULOCYTES # BLD AUTO: 0.08 10*3/MM3 (ref 0–0.05)
IMM GRANULOCYTES # BLD AUTO: 0.09 10*3/MM3 (ref 0–0.05)
IMM GRANULOCYTES # BLD AUTO: 0.09 10*3/MM3 (ref 0–0.05)
IMM GRANULOCYTES # BLD AUTO: 0.13 10*3/MM3 (ref 0–0.05)
IMM GRANULOCYTES # BLD AUTO: 0.14 10*3/MM3 (ref 0–0.05)
IMM GRANULOCYTES # BLD AUTO: 0.4 10*3/MM3 (ref 0–0.05)
IMM GRANULOCYTES # BLD AUTO: 0.6 10*3/MM3 (ref 0–0.05)
IMM GRANULOCYTES NFR BLD AUTO: 0.7 % (ref 0–0.5)
IMM GRANULOCYTES NFR BLD AUTO: 0.8 % (ref 0–0.5)
IMM GRANULOCYTES NFR BLD AUTO: 0.9 % (ref 0–0.5)
IMM GRANULOCYTES NFR BLD AUTO: 1 % (ref 0–0.5)
IMM GRANULOCYTES NFR BLD AUTO: 1 % (ref 0–0.5)
IMM GRANULOCYTES NFR BLD AUTO: 1.1 % (ref 0–0.5)
IMM GRANULOCYTES NFR BLD AUTO: 1.4 % (ref 0–0.5)
IMM GRANULOCYTES NFR BLD AUTO: 1.4 % (ref 0–0.5)
IMM GRANULOCYTES NFR BLD AUTO: 2.6 % (ref 0–0.5)
IMM GRANULOCYTES NFR BLD AUTO: 3.9 % (ref 0–0.5)
INHALED O2 CONCENTRATION: 100 %
INHALED O2 CONCENTRATION: 21 %
INHALED O2 CONCENTRATION: 28 %
INR PPP: 1.4 (ref 2–3)
INR PPP: 1.41 (ref 2–3)
INR PPP: 1.55 (ref 2–3)
INR PPP: 1.6 (ref 2–3)
INR PPP: 1.6 (ref 2–3)
IVRT: 53 MSEC
IVRT: 63 MSEC
KETONES UR QL STRIP: ABNORMAL
KETONES UR QL STRIP: NEGATIVE
L PNEUMO1 AG UR QL IA: NEGATIVE
LAB AP CASE REPORT: NORMAL
LAB AP CLINICAL INFORMATION: NORMAL
LACTATE BLDA-SCNC: 1.45 MMOL/L (ref 0.5–2)
LACTATE BLDA-SCNC: 1.59 MMOL/L (ref 0.5–2)
LARGE PLATELETS: NORMAL
LEFT ATRIUM VOLUME INDEX: 16.6 ML/M2
LEFT ATRIUM VOLUME INDEX: 25.1 ML/M2
LEUKOCYTE ESTERASE UR QL STRIP.AUTO: ABNORMAL
LEUKOCYTE ESTERASE UR QL STRIP.AUTO: NEGATIVE
LIPASE SERPL-CCNC: 150 U/L (ref 13–60)
LIPASE SERPL-CCNC: 29 U/L (ref 13–60)
LYMPHOCYTES # BLD AUTO: 0.67 10*3/MM3 (ref 0.7–3.1)
LYMPHOCYTES # BLD AUTO: 0.71 10*3/MM3 (ref 0.7–3.1)
LYMPHOCYTES # BLD AUTO: 0.88 10*3/MM3 (ref 0.7–3.1)
LYMPHOCYTES # BLD AUTO: 0.95 10*3/MM3 (ref 0.7–3.1)
LYMPHOCYTES # BLD AUTO: 0.98 10*3/MM3 (ref 0.7–3.1)
LYMPHOCYTES # BLD AUTO: 1.01 10*3/MM3 (ref 0.7–3.1)
LYMPHOCYTES # BLD AUTO: 1.04 10*3/MM3 (ref 0.7–3.1)
LYMPHOCYTES # BLD AUTO: 1.09 10*3/MM3 (ref 0.7–3.1)
LYMPHOCYTES # BLD AUTO: 1.11 10*3/MM3 (ref 0.7–3.1)
LYMPHOCYTES # BLD AUTO: 1.28 10*3/MM3 (ref 0.7–3.1)
LYMPHOCYTES # BLD MANUAL: 0.19 10*3/MM3 (ref 0.7–3.1)
LYMPHOCYTES # BLD MANUAL: 0.9 10*3/MM3 (ref 0.7–3.1)
LYMPHOCYTES NFR BLD AUTO: 12.2 % (ref 19.6–45.3)
LYMPHOCYTES NFR BLD AUTO: 13.3 % (ref 19.6–45.3)
LYMPHOCYTES NFR BLD AUTO: 13.7 % (ref 19.6–45.3)
LYMPHOCYTES NFR BLD AUTO: 6.9 % (ref 19.6–45.3)
LYMPHOCYTES NFR BLD AUTO: 7.1 % (ref 19.6–45.3)
LYMPHOCYTES NFR BLD AUTO: 8.4 % (ref 19.6–45.3)
LYMPHOCYTES NFR BLD AUTO: 8.5 % (ref 19.6–45.3)
LYMPHOCYTES NFR BLD AUTO: 9 % (ref 19.6–45.3)
LYMPHOCYTES NFR BLD AUTO: 9.1 % (ref 19.6–45.3)
LYMPHOCYTES NFR BLD AUTO: 9.7 % (ref 19.6–45.3)
LYMPHOCYTES NFR BLD MANUAL: 1 % (ref 5–12)
LYMPHOCYTES NFR BLD MANUAL: 10 % (ref 5–12)
LYMPHOCYTES NFR BLD MANUAL: 2 % (ref 19.6–45.3)
MACROCYTES BLD QL SMEAR: ABNORMAL
MACROCYTES BLD QL SMEAR: NORMAL
MAGNESIUM SERPL-MCNC: 1.4 MG/DL (ref 1.6–2.6)
MAGNESIUM SERPL-MCNC: 1.8 MG/DL (ref 1.6–2.6)
MAGNESIUM SERPL-MCNC: 1.8 MG/DL (ref 1.6–2.6)
MAGNESIUM SERPL-MCNC: 1.9 MG/DL (ref 1.6–2.6)
MAGNESIUM SERPL-MCNC: 2 MG/DL (ref 1.6–2.6)
MAGNESIUM SERPL-MCNC: 2.1 MG/DL (ref 1.6–2.6)
MAGNESIUM SERPL-MCNC: 2.7 MG/DL (ref 1.6–2.6)
MAGNESIUM SERPL-MCNC: 2.9 MG/DL (ref 1.6–2.6)
MAGNESIUM SERPL-MCNC: 3.1 MG/DL (ref 1.6–2.6)
MAGNESIUM SERPL-MCNC: 3.6 MG/DL (ref 1.6–2.6)
MAXIMAL PREDICTED HEART RATE: 177 BPM
MAXIMAL PREDICTED HEART RATE: 177 BPM
MCH RBC QN AUTO: 32.4 PG (ref 26.6–33)
MCH RBC QN AUTO: 33.6 PG (ref 26.6–33)
MCH RBC QN AUTO: 33.7 PG (ref 26.6–33)
MCH RBC QN AUTO: 33.7 PG (ref 26.6–33)
MCH RBC QN AUTO: 34 PG (ref 26.6–33)
MCH RBC QN AUTO: 34.3 PG (ref 26.6–33)
MCH RBC QN AUTO: 34.3 PG (ref 26.6–33)
MCH RBC QN AUTO: 34.7 PG (ref 26.6–33)
MCH RBC QN AUTO: 34.8 PG (ref 26.6–33)
MCH RBC QN AUTO: 34.9 PG (ref 26.6–33)
MCH RBC QN AUTO: 35.1 PG (ref 26.6–33)
MCH RBC QN AUTO: 35.1 PG (ref 26.6–33)
MCHC RBC AUTO-ENTMCNC: 32.2 G/DL (ref 31.5–35.7)
MCHC RBC AUTO-ENTMCNC: 33.1 G/DL (ref 31.5–35.7)
MCHC RBC AUTO-ENTMCNC: 33.6 G/DL (ref 31.5–35.7)
MCHC RBC AUTO-ENTMCNC: 33.6 G/DL (ref 31.5–35.7)
MCHC RBC AUTO-ENTMCNC: 34.2 G/DL (ref 31.5–35.7)
MCHC RBC AUTO-ENTMCNC: 34.2 G/DL (ref 31.5–35.7)
MCHC RBC AUTO-ENTMCNC: 34.3 G/DL (ref 31.5–35.7)
MCHC RBC AUTO-ENTMCNC: 34.4 G/DL (ref 31.5–35.7)
MCHC RBC AUTO-ENTMCNC: 35 G/DL (ref 31.5–35.7)
MCHC RBC AUTO-ENTMCNC: 35.4 G/DL (ref 31.5–35.7)
MCHC RBC AUTO-ENTMCNC: 35.9 G/DL (ref 31.5–35.7)
MCHC RBC AUTO-ENTMCNC: 36.2 G/DL (ref 31.5–35.7)
MCV RBC AUTO: 101.8 FL (ref 79–97)
MCV RBC AUTO: 102.2 FL (ref 79–97)
MCV RBC AUTO: 103 FL (ref 79–97)
MCV RBC AUTO: 103.9 FL (ref 79–97)
MCV RBC AUTO: 109.2 FL (ref 79–97)
MCV RBC AUTO: 93.2 FL (ref 79–97)
MCV RBC AUTO: 96.4 FL (ref 79–97)
MCV RBC AUTO: 96.6 FL (ref 79–97)
MCV RBC AUTO: 97 FL (ref 79–97)
MCV RBC AUTO: 97.9 FL (ref 79–97)
MCV RBC AUTO: 98 FL (ref 79–97)
MCV RBC AUTO: 98.4 FL (ref 79–97)
METAMYELOCYTES NFR BLD MANUAL: 3 % (ref 0–0)
METHADONE UR QL SCN: NEGATIVE
METHGB BLD QL: 0.1 % (ref 0–1.5)
METHGB BLD QL: 0.2 % (ref 0–1.5)
METHGB BLD QL: 0.4 % (ref 0–1.5)
MICROCYTES BLD QL: NORMAL
MODALITY: ABNORMAL
MONOCYTES # BLD AUTO: 0.1 10*3/MM3 (ref 0.1–0.9)
MONOCYTES # BLD AUTO: 0.93 10*3/MM3 (ref 0.1–0.9)
MONOCYTES # BLD AUTO: 1.2 10*3/MM3 (ref 0.1–0.9)
MONOCYTES # BLD AUTO: 1.22 10*3/MM3 (ref 0.1–0.9)
MONOCYTES # BLD AUTO: 1.24 10*3/MM3 (ref 0.1–0.9)
MONOCYTES # BLD AUTO: 1.35 10*3/MM3 (ref 0.1–0.9)
MONOCYTES # BLD AUTO: 1.52 10*3/MM3 (ref 0.1–0.9)
MONOCYTES # BLD AUTO: 1.85 10*3/MM3 (ref 0.1–0.9)
MONOCYTES # BLD AUTO: 2.07 10*3/MM3 (ref 0.1–0.9)
MONOCYTES # BLD AUTO: 2.37 10*3/MM3 (ref 0.1–0.9)
MONOCYTES # BLD AUTO: 2.88 10*3/MM3 (ref 0.1–0.9)
MONOCYTES # BLD: 1.49 10*3/MM3 (ref 0.1–0.9)
MONOCYTES NFR BLD AUTO: 11.2 % (ref 5–12)
MONOCYTES NFR BLD AUTO: 12.3 % (ref 5–12)
MONOCYTES NFR BLD AUTO: 13.5 % (ref 5–12)
MONOCYTES NFR BLD AUTO: 15.5 % (ref 5–12)
MONOCYTES NFR BLD AUTO: 15.9 % (ref 5–12)
MONOCYTES NFR BLD AUTO: 17.4 % (ref 5–12)
MONOCYTES NFR BLD AUTO: 17.5 % (ref 5–12)
MONOCYTES NFR BLD AUTO: 18.1 % (ref 5–12)
MONOCYTES NFR BLD AUTO: 18.2 % (ref 5–12)
MONOCYTES NFR BLD AUTO: 19 % (ref 5–12)
MRSA DNA SPEC QL NAA+PROBE: NORMAL
MRSA DNA SPEC QL NAA+PROBE: NORMAL
MYELOCYTES NFR BLD MANUAL: 1 % (ref 0–0)
N GONORRHOEA RRNA SPEC QL NAA+PROBE: NOT DETECTED
NEUTROPHILS # BLD AUTO: 12.4 10*3/MM3 (ref 1.7–7)
NEUTROPHILS # BLD AUTO: 8.84 10*3/MM3 (ref 1.7–7)
NEUTROPHILS NFR BLD AUTO: 10.42 10*3/MM3 (ref 1.7–7)
NEUTROPHILS NFR BLD AUTO: 11.15 10*3/MM3 (ref 1.7–7)
NEUTROPHILS NFR BLD AUTO: 4.66 10*3/MM3 (ref 1.7–7)
NEUTROPHILS NFR BLD AUTO: 5.46 10*3/MM3 (ref 1.7–7)
NEUTROPHILS NFR BLD AUTO: 6.16 10*3/MM3 (ref 1.7–7)
NEUTROPHILS NFR BLD AUTO: 6.23 10*3/MM3 (ref 1.7–7)
NEUTROPHILS NFR BLD AUTO: 6.66 10*3/MM3 (ref 1.7–7)
NEUTROPHILS NFR BLD AUTO: 65.4 % (ref 42.7–76)
NEUTROPHILS NFR BLD AUTO: 65.5 % (ref 42.7–76)
NEUTROPHILS NFR BLD AUTO: 69 % (ref 42.7–76)
NEUTROPHILS NFR BLD AUTO: 7.61 10*3/MM3 (ref 1.7–7)
NEUTROPHILS NFR BLD AUTO: 7.67 10*3/MM3 (ref 1.7–7)
NEUTROPHILS NFR BLD AUTO: 71 % (ref 42.7–76)
NEUTROPHILS NFR BLD AUTO: 72 % (ref 42.7–76)
NEUTROPHILS NFR BLD AUTO: 72.8 % (ref 42.7–76)
NEUTROPHILS NFR BLD AUTO: 73.6 % (ref 42.7–76)
NEUTROPHILS NFR BLD AUTO: 73.8 % (ref 42.7–76)
NEUTROPHILS NFR BLD AUTO: 74.4 % (ref 42.7–76)
NEUTROPHILS NFR BLD AUTO: 78.7 % (ref 42.7–76)
NEUTROPHILS NFR BLD AUTO: 8.12 10*3/MM3 (ref 1.7–7)
NEUTROPHILS NFR BLD MANUAL: 80 % (ref 42.7–76)
NEUTROPHILS NFR BLD MANUAL: 87 % (ref 42.7–76)
NEUTS BAND NFR BLD MANUAL: 3 % (ref 0–5)
NEUTS BAND NFR BLD MANUAL: 6 % (ref 0–5)
NITRITE UR QL STRIP: NEGATIVE
NITRITE UR QL STRIP: NEGATIVE
NITRITE UR QL STRIP: POSITIVE
NITRITE UR QL STRIP: POSITIVE
NOTE: ABNORMAL
NOTE: ABNORMAL
NRBC BLD AUTO-RTO: 0 /100 WBC (ref 0–0.2)
NRBC BLD AUTO-RTO: 0.2 /100 WBC (ref 0–0.2)
NRBC BLD AUTO-RTO: 0.3 /100 WBC (ref 0–0.2)
NRBC BLD AUTO-RTO: 0.4 /100 WBC (ref 0–0.2)
NRBC BLD AUTO-RTO: 0.4 /100 WBC (ref 0–0.2)
NT-PROBNP SERPL-MCNC: 1324 PG/ML (ref 0–450)
NT-PROBNP SERPL-MCNC: 555.8 PG/ML (ref 0–450)
OPIATES UR QL: NEGATIVE
OSMOLALITY SERPL: 290 MOSM/KG (ref 275–295)
OSMOLALITY UR: 313 MOSM/KG (ref 50–1400)
OSMOLALITY UR: 375 MOSM/KG (ref 50–1400)
OVALOCYTES BLD QL SMEAR: NORMAL
OXYCODONE UR QL SCN: NEGATIVE
OXYHGB MFR BLDV: 90.3 % (ref 94–99)
OXYHGB MFR BLDV: 91.4 % (ref 94–99)
OXYHGB MFR BLDV: 92.7 % (ref 94–99)
PATH REPORT.FINAL DX SPEC: NORMAL
PATH REPORT.GROSS SPEC: NORMAL
PCO2 BLDA: 33 MM HG (ref 35–45)
PCO2 BLDA: 39.3 MM HG (ref 35–45)
PCO2 BLDA: 52.3 MM HG (ref 35–45)
PH BLDA: 7.19 PH UNITS (ref 7.35–7.45)
PH BLDA: 7.36 PH UNITS (ref 7.35–7.45)
PH BLDA: 7.44 PH UNITS (ref 7.35–7.45)
PH UR STRIP.AUTO: 7 [PH] (ref 5–8)
PH UR STRIP.AUTO: 7.5 [PH] (ref 5–8)
PH UR STRIP.AUTO: 7.5 [PH] (ref 5–8)
PH UR STRIP.AUTO: <=5 [PH] (ref 5–8)
PHOSPHATE SERPL-MCNC: 1.5 MG/DL (ref 2.5–4.5)
PHOSPHATE SERPL-MCNC: 1.8 MG/DL (ref 2.5–4.5)
PHOSPHATE SERPL-MCNC: 1.9 MG/DL (ref 2.5–4.5)
PHOSPHATE SERPL-MCNC: 12.2 MG/DL (ref 2.5–4.5)
PHOSPHATE SERPL-MCNC: 2.4 MG/DL (ref 2.5–4.5)
PHOSPHATE SERPL-MCNC: 8.3 MG/DL (ref 2.5–4.5)
PHOSPHATE SERPL-MCNC: 9.2 MG/DL (ref 2.5–4.5)
PLAT MORPH BLD: NORMAL
PLATELET # BLD AUTO: 102 10*3/MM3 (ref 140–450)
PLATELET # BLD AUTO: 114 10*3/MM3 (ref 140–450)
PLATELET # BLD AUTO: 118 10*3/MM3 (ref 140–450)
PLATELET # BLD AUTO: 175 10*3/MM3 (ref 140–450)
PLATELET # BLD AUTO: 191 10*3/MM3 (ref 140–450)
PLATELET # BLD AUTO: 192 10*3/MM3 (ref 140–450)
PLATELET # BLD AUTO: 203 10*3/MM3 (ref 140–450)
PLATELET # BLD AUTO: 304 10*3/MM3 (ref 140–450)
PLATELET # BLD AUTO: 312 10*3/MM3 (ref 140–450)
PLATELET # BLD AUTO: 390 10*3/MM3 (ref 140–450)
PLATELET # BLD AUTO: 98 10*3/MM3 (ref 140–450)
PLATELET # BLD AUTO: 99 10*3/MM3 (ref 140–450)
PMV BLD AUTO: 10 FL (ref 6–12)
PMV BLD AUTO: 10.4 FL (ref 6–12)
PMV BLD AUTO: 10.5 FL (ref 6–12)
PMV BLD AUTO: 10.5 FL (ref 6–12)
PMV BLD AUTO: 10.8 FL (ref 6–12)
PMV BLD AUTO: 11 FL (ref 6–12)
PMV BLD AUTO: 11.1 FL (ref 6–12)
PMV BLD AUTO: 11.3 FL (ref 6–12)
PMV BLD AUTO: 11.3 FL (ref 6–12)
PMV BLD AUTO: 9.7 FL (ref 6–12)
PO2 BLD: 271 MM[HG] (ref 0–500)
PO2 BLD: 325 MM[HG] (ref 0–500)
PO2 BLD: 82 MM[HG] (ref 0–500)
PO2 BLDA: 68.3 MM HG (ref 80–100)
PO2 BLDA: 75.9 MM HG (ref 80–100)
PO2 BLDA: 81.8 MM HG (ref 80–100)
POIKILOCYTOSIS BLD QL SMEAR: ABNORMAL
POIKILOCYTOSIS BLD QL SMEAR: NORMAL
POLYCHROMASIA BLD QL SMEAR: NORMAL
POLYCHROMASIA BLD QL SMEAR: NORMAL
POTASSIUM BLDA-SCNC: 3.98 MMOL/L (ref 3.5–5)
POTASSIUM BLDA-SCNC: 4.82 MMOL/L (ref 3.5–5)
POTASSIUM SERPL-SCNC: 3.2 MMOL/L (ref 3.5–5.2)
POTASSIUM SERPL-SCNC: 3.3 MMOL/L (ref 3.5–5.2)
POTASSIUM SERPL-SCNC: 3.5 MMOL/L (ref 3.5–5.2)
POTASSIUM SERPL-SCNC: 3.7 MMOL/L (ref 3.5–5.2)
POTASSIUM SERPL-SCNC: 3.8 MMOL/L (ref 3.5–5.2)
POTASSIUM SERPL-SCNC: 3.9 MMOL/L (ref 3.5–5.2)
POTASSIUM SERPL-SCNC: 4 MMOL/L (ref 3.5–5.2)
POTASSIUM SERPL-SCNC: 4.1 MMOL/L (ref 3.5–5.2)
POTASSIUM SERPL-SCNC: 4.2 MMOL/L (ref 3.5–5.2)
POTASSIUM SERPL-SCNC: 4.5 MMOL/L (ref 3.5–5.2)
POTASSIUM SERPL-SCNC: 5.1 MMOL/L (ref 3.5–5.2)
POTASSIUM SERPL-SCNC: 5.6 MMOL/L (ref 3.5–5.2)
PROCALCITONIN SERPL-MCNC: 0.21 NG/ML (ref 0–0.25)
PROCALCITONIN SERPL-MCNC: 0.91 NG/ML (ref 0–0.25)
PROT SERPL-MCNC: 7.4 G/DL (ref 6–8.5)
PROT SERPL-MCNC: 7.8 G/DL (ref 6–8.5)
PROT SERPL-MCNC: 7.9 G/DL (ref 6–8.5)
PROT SERPL-MCNC: 8.1 G/DL (ref 6–8.5)
PROT SERPL-MCNC: 8.2 G/DL (ref 6–8.5)
PROT SERPL-MCNC: 8.3 G/DL (ref 6–8.5)
PROT SERPL-MCNC: 8.3 G/DL (ref 6–8.5)
PROT UR QL STRIP: ABNORMAL
PROT UR QL STRIP: NEGATIVE
PROTHROMBIN TIME: 14 SECONDS (ref 9.4–12)
PROTHROMBIN TIME: 14.1 SECONDS (ref 9.4–12)
PROTHROMBIN TIME: 15.4 SECONDS (ref 9.4–12)
PROTHROMBIN TIME: 15.9 SECONDS (ref 9.4–12)
PROTHROMBIN TIME: 15.9 SECONDS (ref 9.4–12)
QT INTERVAL: 351 MS
QT INTERVAL: 397 MS
QT INTERVAL: 441 MS
QT INTERVAL: 485 MS
RBC # BLD AUTO: 2.27 10*6/MM3 (ref 4.14–5.8)
RBC # BLD AUTO: 2.31 10*6/MM3 (ref 4.14–5.8)
RBC # BLD AUTO: 2.32 10*6/MM3 (ref 4.14–5.8)
RBC # BLD AUTO: 2.35 10*6/MM3 (ref 4.14–5.8)
RBC # BLD AUTO: 2.36 10*6/MM3 (ref 4.14–5.8)
RBC # BLD AUTO: 2.39 10*6/MM3 (ref 4.14–5.8)
RBC # BLD AUTO: 2.42 10*6/MM3 (ref 4.14–5.8)
RBC # BLD AUTO: 2.47 10*6/MM3 (ref 4.14–5.8)
RBC # BLD AUTO: 2.55 10*6/MM3 (ref 4.14–5.8)
RBC # BLD AUTO: 2.64 10*6/MM3 (ref 4.14–5.8)
RBC # BLD AUTO: 2.65 10*6/MM3 (ref 4.14–5.8)
RBC # BLD AUTO: 3.61 10*6/MM3 (ref 4.14–5.8)
RBC # UR STRIP: ABNORMAL /HPF
RBC # UR: ABNORMAL /HPF
RBC MORPH BLD: NORMAL
REF LAB TEST METHOD: ABNORMAL
REF LAB TEST METHOD: ABNORMAL
S PNEUM AG SPEC QL LA: NEGATIVE
SAO2 % BLDCOA: 91.6 % (ref 95–99)
SAO2 % BLDCOA: 92.1 % (ref 95–99)
SAO2 % BLDCOA: 93.9 % (ref 95–99)
SARS-COV-2 N GENE RESP QL NAA+PROBE: NOT DETECTED
SARS-COV-2 RNA SPEC QL NAA+PROBE: NOT DETECTED
SCAN SLIDE: NORMAL
SMALL PLATELETS BLD QL SMEAR: ABNORMAL
SMALL PLATELETS BLD QL SMEAR: ADEQUATE
SMALL PLATELETS BLD QL SMEAR: ADEQUATE
SMALL PLATELETS BLD QL SMEAR: NORMAL
SMALL PLATELETS BLD QL SMEAR: NORMAL
SODIUM BLDA-SCNC: 132.5 MMOL/L (ref 136–146)
SODIUM BLDA-SCNC: 132.8 MMOL/L (ref 136–146)
SODIUM SERPL-SCNC: 129 MMOL/L (ref 136–145)
SODIUM SERPL-SCNC: 131 MMOL/L (ref 136–145)
SODIUM SERPL-SCNC: 132 MMOL/L (ref 136–145)
SODIUM SERPL-SCNC: 133 MMOL/L (ref 136–145)
SODIUM SERPL-SCNC: 134 MMOL/L (ref 136–145)
SODIUM SERPL-SCNC: 134 MMOL/L (ref 136–145)
SODIUM SERPL-SCNC: 135 MMOL/L (ref 136–145)
SODIUM SERPL-SCNC: 135 MMOL/L (ref 136–145)
SODIUM SERPL-SCNC: 136 MMOL/L (ref 136–145)
SODIUM SERPL-SCNC: 137 MMOL/L (ref 136–145)
SODIUM SERPL-SCNC: 138 MMOL/L (ref 136–145)
SODIUM UR-SCNC: 28 MMOL/L
SODIUM UR-SCNC: <20 MMOL/L
SP GR UR STRIP: 1.01 (ref 1–1.03)
SP GR UR STRIP: 1.01 (ref 1–1.03)
SP GR UR STRIP: 1.02 (ref 1–1.03)
SP GR UR STRIP: 1.02 (ref 1–1.03)
SQUAMOUS #/AREA URNS HPF: ABNORMAL /HPF
SQUAMOUS #/AREA URNS HPF: ABNORMAL /HPF
STOMATOCYTES BLD QL SMEAR: NORMAL
STRESS TARGET HR: 150 BPM
STRESS TARGET HR: 150 BPM
TARGETS BLD QL SMEAR: ABNORMAL
TARGETS BLD QL SMEAR: NORMAL
TROPONIN T SERPL-MCNC: <0.01 NG/ML (ref 0–0.03)
TROPONIN T SERPL-MCNC: <0.01 NG/ML (ref 0–0.03)
UROBILINOGEN UR QL STRIP: ABNORMAL
UROBILINOGEN UR QL STRIP: NORMAL
VANCOMYCIN SERPL-MCNC: 15.72 MCG/ML (ref 5–40)
VANCOMYCIN SERPL-MCNC: 18.29 MCG/ML (ref 5–40)
VANCOMYCIN SERPL-MCNC: 20.08 MCG/ML (ref 5–40)
VANCOMYCIN SERPL-MCNC: 32.86 MCG/ML (ref 5–40)
VARIANT LYMPHS NFR BLD MANUAL: 6 % (ref 19.6–45.3)
WBC # BLD AUTO: 7.13 10*3/MM3 (ref 3.4–10.8)
WBC # BLD AUTO: 8.28 10*3/MM3 (ref 3.4–10.8)
WBC # BLD AUTO: 8.34 10*3/MM3 (ref 3.4–10.8)
WBC # BLD AUTO: 9.03 10*3/MM3 (ref 3.4–10.8)
WBC # BLD AUTO: 9.51 10*3/MM3 (ref 3.4–10.8)
WBC # BLD AUTO: 9.75 10*3/MM3 (ref 3.4–10.8)
WBC # UR STRIP: ABNORMAL /HPF
WBC MORPH BLD: NORMAL
WBC NRBC COR # BLD: 10.56 10*3/MM3 (ref 3.4–10.8)
WBC NRBC COR # BLD: 11.44 10*3/MM3 (ref 3.4–10.8)
WBC NRBC COR # BLD: 14.94 10*3/MM3 (ref 3.4–10.8)
WBC NRBC COR # BLD: 15.12 10*3/MM3 (ref 3.4–10.8)
WBC NRBC COR # BLD: 15.31 10*3/MM3 (ref 3.4–10.8)
WBC NRBC COR # BLD: 8.47 10*3/MM3 (ref 3.4–10.8)
WBC UR QL AUTO: ABNORMAL /HPF
WHOLE BLOOD HOLD SPECIMEN: NORMAL

## 2021-01-01 PROCEDURE — 25010000002 PROPOFOL 10 MG/ML EMULSION: Performed by: PHYSICIAN ASSISTANT

## 2021-01-01 PROCEDURE — 80307 DRUG TEST PRSMV CHEM ANLYZR: CPT | Performed by: PHYSICIAN ASSISTANT

## 2021-01-01 PROCEDURE — 80053 COMPREHEN METABOLIC PANEL: CPT | Performed by: INTERNAL MEDICINE

## 2021-01-01 PROCEDURE — 94799 UNLISTED PULMONARY SVC/PX: CPT

## 2021-01-01 PROCEDURE — 82805 BLOOD GASES W/O2 SATURATION: CPT | Performed by: INTERNAL MEDICINE

## 2021-01-01 PROCEDURE — 71045 X-RAY EXAM CHEST 1 VIEW: CPT

## 2021-01-01 PROCEDURE — 25010000002 ENOXAPARIN PER 10 MG: Performed by: INTERNAL MEDICINE

## 2021-01-01 PROCEDURE — 83690 ASSAY OF LIPASE: CPT | Performed by: INTERNAL MEDICINE

## 2021-01-01 PROCEDURE — 93005 ELECTROCARDIOGRAM TRACING: CPT | Performed by: INTERNAL MEDICINE

## 2021-01-01 PROCEDURE — 96375 TX/PRO/DX INJ NEW DRUG ADDON: CPT

## 2021-01-01 PROCEDURE — 80202 ASSAY OF VANCOMYCIN: CPT | Performed by: INTERNAL MEDICINE

## 2021-01-01 PROCEDURE — 87641 MR-STAPH DNA AMP PROBE: CPT | Performed by: PHYSICIAN ASSISTANT

## 2021-01-01 PROCEDURE — 93005 ELECTROCARDIOGRAM TRACING: CPT

## 2021-01-01 PROCEDURE — 85025 COMPLETE CBC W/AUTO DIFF WBC: CPT | Performed by: INTERNAL MEDICINE

## 2021-01-01 PROCEDURE — 82140 ASSAY OF AMMONIA: CPT | Performed by: HOSPITALIST

## 2021-01-01 PROCEDURE — 94003 VENT MGMT INPAT SUBQ DAY: CPT

## 2021-01-01 PROCEDURE — 25010000002 LEVETIRACETAM IN NACL 0.82% 500 MG/100ML SOLUTION: Performed by: PHYSICIAN ASSISTANT

## 2021-01-01 PROCEDURE — 85610 PROTHROMBIN TIME: CPT | Performed by: INTERNAL MEDICINE

## 2021-01-01 PROCEDURE — 74177 CT ABD & PELVIS W/CONTRAST: CPT

## 2021-01-01 PROCEDURE — 25010000002 MAGNESIUM SULFATE PER 500 MG OF MAGNESIUM: Performed by: EMERGENCY MEDICINE

## 2021-01-01 PROCEDURE — 93010 ELECTROCARDIOGRAM REPORT: CPT | Performed by: INTERNAL MEDICINE

## 2021-01-01 PROCEDURE — 25010000002 FUROSEMIDE PER 20 MG: Performed by: INTERNAL MEDICINE

## 2021-01-01 PROCEDURE — 83930 ASSAY OF BLOOD OSMOLALITY: CPT | Performed by: PHYSICIAN ASSISTANT

## 2021-01-01 PROCEDURE — 99239 HOSP IP/OBS DSCHRG MGMT >30: CPT | Performed by: INTERNAL MEDICINE

## 2021-01-01 PROCEDURE — 25010000002 MIDAZOLAM 50 MG/10ML SOLUTION: Performed by: PHYSICIAN ASSISTANT

## 2021-01-01 PROCEDURE — 76870 US EXAM SCROTUM: CPT

## 2021-01-01 PROCEDURE — 36415 COLL VENOUS BLD VENIPUNCTURE: CPT | Performed by: INTERNAL MEDICINE

## 2021-01-01 PROCEDURE — 84100 ASSAY OF PHOSPHORUS: CPT | Performed by: INTERNAL MEDICINE

## 2021-01-01 PROCEDURE — 83050 HGB METHEMOGLOBIN QUAN: CPT | Performed by: EMERGENCY MEDICINE

## 2021-01-01 PROCEDURE — 25010000002 PROPOFOL 10 MG/ML EMULSION: Performed by: NURSE ANESTHETIST, CERTIFIED REGISTERED

## 2021-01-01 PROCEDURE — 74176 CT ABD & PELVIS W/O CONTRAST: CPT

## 2021-01-01 PROCEDURE — 0 CEFTRIAXONE PER 250 MG: Performed by: PHYSICIAN ASSISTANT

## 2021-01-01 PROCEDURE — 36556 INSERT NON-TUNNEL CV CATH: CPT | Performed by: INTERNAL MEDICINE

## 2021-01-01 PROCEDURE — 25010000002 CALCIUM GLUCONATE-NACL 1-0.675 GM/50ML-% SOLUTION: Performed by: PHYSICIAN ASSISTANT

## 2021-01-01 PROCEDURE — 85007 BL SMEAR W/DIFF WBC COUNT: CPT | Performed by: FAMILY MEDICINE

## 2021-01-01 PROCEDURE — 80053 COMPREHEN METABOLIC PANEL: CPT | Performed by: EMERGENCY MEDICINE

## 2021-01-01 PROCEDURE — 25010000002 HEPARIN (PORCINE) PER 1000 UNITS: Performed by: INTERNAL MEDICINE

## 2021-01-01 PROCEDURE — 99233 SBSQ HOSP IP/OBS HIGH 50: CPT | Performed by: INTERNAL MEDICINE

## 2021-01-01 PROCEDURE — 87491 CHLMYD TRACH DNA AMP PROBE: CPT | Performed by: PHYSICIAN ASSISTANT

## 2021-01-01 PROCEDURE — 82570 ASSAY OF URINE CREATININE: CPT | Performed by: INTERNAL MEDICINE

## 2021-01-01 PROCEDURE — 25010000002 FUROSEMIDE PER 20 MG: Performed by: PHYSICIAN ASSISTANT

## 2021-01-01 PROCEDURE — 83605 ASSAY OF LACTIC ACID: CPT | Performed by: INTERNAL MEDICINE

## 2021-01-01 PROCEDURE — 88305 TISSUE EXAM BY PATHOLOGIST: CPT | Performed by: INTERNAL MEDICINE

## 2021-01-01 PROCEDURE — 25010000002 VANCOMYCIN 5 G RECONSTITUTED SOLUTION: Performed by: PHYSICIAN ASSISTANT

## 2021-01-01 PROCEDURE — 76705 ECHO EXAM OF ABDOMEN: CPT

## 2021-01-01 PROCEDURE — 99291 CRITICAL CARE FIRST HOUR: CPT | Performed by: INTERNAL MEDICINE

## 2021-01-01 PROCEDURE — 84300 ASSAY OF URINE SODIUM: CPT | Performed by: PHYSICIAN ASSISTANT

## 2021-01-01 PROCEDURE — 94640 AIRWAY INHALATION TREATMENT: CPT

## 2021-01-01 PROCEDURE — 4A133J1 MONITORING OF ARTERIAL PULSE, PERIPHERAL, PERCUTANEOUS APPROACH: ICD-10-PCS | Performed by: INTERNAL MEDICINE

## 2021-01-01 PROCEDURE — 87899 AGENT NOS ASSAY W/OPTIC: CPT | Performed by: INTERNAL MEDICINE

## 2021-01-01 PROCEDURE — 93306 TTE W/DOPPLER COMPLETE: CPT

## 2021-01-01 PROCEDURE — 25010000002 HALOPERIDOL LACTATE PER 5 MG: Performed by: HOSPITALIST

## 2021-01-01 PROCEDURE — 25010000002 LORAZEPAM PER 2 MG: Performed by: INTERNAL MEDICINE

## 2021-01-01 PROCEDURE — 36600 WITHDRAWAL OF ARTERIAL BLOOD: CPT | Performed by: INTERNAL MEDICINE

## 2021-01-01 PROCEDURE — 82077 ASSAY SPEC XCP UR&BREATH IA: CPT | Performed by: EMERGENCY MEDICINE

## 2021-01-01 PROCEDURE — 99284 EMERGENCY DEPT VISIT MOD MDM: CPT

## 2021-01-01 PROCEDURE — 85025 COMPLETE CBC W/AUTO DIFF WBC: CPT | Performed by: PHYSICIAN ASSISTANT

## 2021-01-01 PROCEDURE — 99233 SBSQ HOSP IP/OBS HIGH 50: CPT | Performed by: PSYCHIATRY & NEUROLOGY

## 2021-01-01 PROCEDURE — 25010000002 SULFUR HEXAFLUORIDE MICROSPH 60.7-25 MG RECONSTITUTED SUSPENSION: Performed by: INTERNAL MEDICINE

## 2021-01-01 PROCEDURE — 81001 URINALYSIS AUTO W/SCOPE: CPT | Performed by: PHYSICIAN ASSISTANT

## 2021-01-01 PROCEDURE — 83735 ASSAY OF MAGNESIUM: CPT | Performed by: PHYSICIAN ASSISTANT

## 2021-01-01 PROCEDURE — B548ZZA ULTRASONOGRAPHY OF SUPERIOR VENA CAVA, GUIDANCE: ICD-10-PCS | Performed by: INTERNAL MEDICINE

## 2021-01-01 PROCEDURE — 82375 ASSAY CARBOXYHB QUANT: CPT | Performed by: INTERNAL MEDICINE

## 2021-01-01 PROCEDURE — 84100 ASSAY OF PHOSPHORUS: CPT | Performed by: FAMILY MEDICINE

## 2021-01-01 PROCEDURE — 85025 COMPLETE CBC W/AUTO DIFF WBC: CPT | Performed by: FAMILY MEDICINE

## 2021-01-01 PROCEDURE — 25010000002 CEFTRIAXONE PER 250 MG: Performed by: PHYSICIAN ASSISTANT

## 2021-01-01 PROCEDURE — 25010000002 CEFEPIME PER 500 MG: Performed by: INTERNAL MEDICINE

## 2021-01-01 PROCEDURE — 25010000002 THIAMINE PER 100 MG: Performed by: PHYSICIAN ASSISTANT

## 2021-01-01 PROCEDURE — 5A1955Z RESPIRATORY VENTILATION, GREATER THAN 96 CONSECUTIVE HOURS: ICD-10-PCS | Performed by: EMERGENCY MEDICINE

## 2021-01-01 PROCEDURE — 83605 ASSAY OF LACTIC ACID: CPT | Performed by: EMERGENCY MEDICINE

## 2021-01-01 PROCEDURE — 94002 VENT MGMT INPAT INIT DAY: CPT

## 2021-01-01 PROCEDURE — 94761 N-INVAS EAR/PLS OXIMETRY MLT: CPT

## 2021-01-01 PROCEDURE — 83050 HGB METHEMOGLOBIN QUAN: CPT | Performed by: INTERNAL MEDICINE

## 2021-01-01 PROCEDURE — 82550 ASSAY OF CK (CPK): CPT | Performed by: INTERNAL MEDICINE

## 2021-01-01 PROCEDURE — 83735 ASSAY OF MAGNESIUM: CPT | Performed by: INTERNAL MEDICINE

## 2021-01-01 PROCEDURE — 99222 1ST HOSP IP/OBS MODERATE 55: CPT | Performed by: INTERNAL MEDICINE

## 2021-01-01 PROCEDURE — 85007 BL SMEAR W/DIFF WBC COUNT: CPT | Performed by: EMERGENCY MEDICINE

## 2021-01-01 PROCEDURE — 36620 INSERTION CATHETER ARTERY: CPT | Performed by: INTERNAL MEDICINE

## 2021-01-01 PROCEDURE — 82570 ASSAY OF URINE CREATININE: CPT | Performed by: PHYSICIAN ASSISTANT

## 2021-01-01 PROCEDURE — 95819 EEG AWAKE AND ASLEEP: CPT

## 2021-01-01 PROCEDURE — 80074 ACUTE HEPATITIS PANEL: CPT | Performed by: INTERNAL MEDICINE

## 2021-01-01 PROCEDURE — 76937 US GUIDE VASCULAR ACCESS: CPT | Performed by: INTERNAL MEDICINE

## 2021-01-01 PROCEDURE — 25010000002 FUROSEMIDE PER 20 MG: Performed by: EMERGENCY MEDICINE

## 2021-01-01 PROCEDURE — 80202 ASSAY OF VANCOMYCIN: CPT | Performed by: PHYSICIAN ASSISTANT

## 2021-01-01 PROCEDURE — 87591 N.GONORRHOEAE DNA AMP PROB: CPT | Performed by: PHYSICIAN ASSISTANT

## 2021-01-01 PROCEDURE — 99285 EMERGENCY DEPT VISIT HI MDM: CPT

## 2021-01-01 PROCEDURE — 84484 ASSAY OF TROPONIN QUANT: CPT

## 2021-01-01 PROCEDURE — 25010000002 LORAZEPAM PER 2 MG: Performed by: HOSPITALIST

## 2021-01-01 PROCEDURE — 85025 COMPLETE CBC W/AUTO DIFF WBC: CPT

## 2021-01-01 PROCEDURE — 0BH17EZ INSERTION OF ENDOTRACHEAL AIRWAY INTO TRACHEA, VIA NATURAL OR ARTIFICIAL OPENING: ICD-10-PCS | Performed by: EMERGENCY MEDICINE

## 2021-01-01 PROCEDURE — 87635 SARS-COV-2 COVID-19 AMP PRB: CPT | Performed by: EMERGENCY MEDICINE

## 2021-01-01 PROCEDURE — 93005 ELECTROCARDIOGRAM TRACING: CPT | Performed by: EMERGENCY MEDICINE

## 2021-01-01 PROCEDURE — 25010000002 ACETAZOLAMIDE PER 500 MG: Performed by: INTERNAL MEDICINE

## 2021-01-01 PROCEDURE — 36600 WITHDRAWAL OF ARTERIAL BLOOD: CPT | Performed by: EMERGENCY MEDICINE

## 2021-01-01 PROCEDURE — 25010000002 ENOXAPARIN PER 10 MG: Performed by: PHYSICIAN ASSISTANT

## 2021-01-01 PROCEDURE — 80053 COMPREHEN METABOLIC PANEL: CPT | Performed by: PHYSICIAN ASSISTANT

## 2021-01-01 PROCEDURE — 85610 PROTHROMBIN TIME: CPT | Performed by: EMERGENCY MEDICINE

## 2021-01-01 PROCEDURE — 85007 BL SMEAR W/DIFF WBC COUNT: CPT | Performed by: INTERNAL MEDICINE

## 2021-01-01 PROCEDURE — 99223 1ST HOSP IP/OBS HIGH 75: CPT | Performed by: PSYCHIATRY & NEUROLOGY

## 2021-01-01 PROCEDURE — 80048 BASIC METABOLIC PNL TOTAL CA: CPT | Performed by: FAMILY MEDICINE

## 2021-01-01 PROCEDURE — 31500 INSERT EMERGENCY AIRWAY: CPT

## 2021-01-01 PROCEDURE — 25010000002 AZITHROMYCIN PER 500 MG: Performed by: EMERGENCY MEDICINE

## 2021-01-01 PROCEDURE — 0 IOPAMIDOL PER 1 ML: Performed by: EMERGENCY MEDICINE

## 2021-01-01 PROCEDURE — 25010000002 LORAZEPAM PER 2 MG: Performed by: PHYSICIAN ASSISTANT

## 2021-01-01 PROCEDURE — 84300 ASSAY OF URINE SODIUM: CPT | Performed by: INTERNAL MEDICINE

## 2021-01-01 PROCEDURE — 83880 ASSAY OF NATRIURETIC PEPTIDE: CPT | Performed by: EMERGENCY MEDICINE

## 2021-01-01 PROCEDURE — 87040 BLOOD CULTURE FOR BACTERIA: CPT | Performed by: EMERGENCY MEDICINE

## 2021-01-01 PROCEDURE — 99283 EMERGENCY DEPT VISIT LOW MDM: CPT

## 2021-01-01 PROCEDURE — 99231 SBSQ HOSP IP/OBS SF/LOW 25: CPT | Performed by: PSYCHIATRY & NEUROLOGY

## 2021-01-01 PROCEDURE — 85025 COMPLETE CBC W/AUTO DIFF WBC: CPT | Performed by: EMERGENCY MEDICINE

## 2021-01-01 PROCEDURE — G0378 HOSPITAL OBSERVATION PER HR: HCPCS

## 2021-01-01 PROCEDURE — 83735 ASSAY OF MAGNESIUM: CPT

## 2021-01-01 PROCEDURE — 82805 BLOOD GASES W/O2 SATURATION: CPT | Performed by: EMERGENCY MEDICINE

## 2021-01-01 PROCEDURE — 0 MAGNESIUM SULFATE 4 GM/100ML SOLUTION: Performed by: INTERNAL MEDICINE

## 2021-01-01 PROCEDURE — 25010000002 ONDANSETRON PER 1 MG: Performed by: EMERGENCY MEDICINE

## 2021-01-01 PROCEDURE — 99223 1ST HOSP IP/OBS HIGH 75: CPT | Performed by: PHYSICIAN ASSISTANT

## 2021-01-01 PROCEDURE — 25010000002 VANCOMYCIN 5 G RECONSTITUTED SOLUTION: Performed by: INTERNAL MEDICINE

## 2021-01-01 PROCEDURE — 43239 EGD BIOPSY SINGLE/MULTIPLE: CPT | Performed by: INTERNAL MEDICINE

## 2021-01-01 PROCEDURE — 0 CEFTRIAXONE PER 250 MG: Performed by: EMERGENCY MEDICINE

## 2021-01-01 PROCEDURE — 84145 PROCALCITONIN (PCT): CPT | Performed by: INTERNAL MEDICINE

## 2021-01-01 PROCEDURE — 25010000002 MIDAZOLAM PER 1MG: Performed by: INTERNAL MEDICINE

## 2021-01-01 PROCEDURE — 82375 ASSAY CARBOXYHB QUANT: CPT | Performed by: EMERGENCY MEDICINE

## 2021-01-01 PROCEDURE — 25010000002 MORPHINE PER 10 MG: Performed by: PHYSICIAN ASSISTANT

## 2021-01-01 PROCEDURE — 93306 TTE W/DOPPLER COMPLETE: CPT | Performed by: INTERNAL MEDICINE

## 2021-01-01 PROCEDURE — 85007 BL SMEAR W/DIFF WBC COUNT: CPT

## 2021-01-01 PROCEDURE — 82140 ASSAY OF AMMONIA: CPT | Performed by: EMERGENCY MEDICINE

## 2021-01-01 PROCEDURE — 81003 URINALYSIS AUTO W/O SCOPE: CPT | Performed by: PHYSICIAN ASSISTANT

## 2021-01-01 PROCEDURE — 80076 HEPATIC FUNCTION PANEL: CPT | Performed by: FAMILY MEDICINE

## 2021-01-01 PROCEDURE — 82140 ASSAY OF AMMONIA: CPT

## 2021-01-01 PROCEDURE — 96374 THER/PROPH/DIAG INJ IV PUSH: CPT

## 2021-01-01 PROCEDURE — 36415 COLL VENOUS BLD VENIPUNCTURE: CPT | Performed by: EMERGENCY MEDICINE

## 2021-01-01 PROCEDURE — 80053 COMPREHEN METABOLIC PANEL: CPT

## 2021-01-01 PROCEDURE — 99203 OFFICE O/P NEW LOW 30 MIN: CPT | Performed by: INTERNAL MEDICINE

## 2021-01-01 PROCEDURE — 82962 GLUCOSE BLOOD TEST: CPT

## 2021-01-01 PROCEDURE — 25010000002 EPINEPHRINE 1 MG/10ML SOLUTION PREFILLED SYRINGE: Performed by: HOSPITALIST

## 2021-01-01 PROCEDURE — 84145 PROCALCITONIN (PCT): CPT | Performed by: PHYSICIAN ASSISTANT

## 2021-01-01 PROCEDURE — 81001 URINALYSIS AUTO W/SCOPE: CPT | Performed by: EMERGENCY MEDICINE

## 2021-01-01 PROCEDURE — 74181 MRI ABDOMEN W/O CONTRAST: CPT

## 2021-01-01 PROCEDURE — 83735 ASSAY OF MAGNESIUM: CPT | Performed by: FAMILY MEDICINE

## 2021-01-01 PROCEDURE — 4A133B1 MONITORING OF ARTERIAL PRESSURE, PERIPHERAL, PERCUTANEOUS APPROACH: ICD-10-PCS | Performed by: INTERNAL MEDICINE

## 2021-01-01 PROCEDURE — 83935 ASSAY OF URINE OSMOLALITY: CPT | Performed by: PHYSICIAN ASSISTANT

## 2021-01-01 PROCEDURE — 83935 ASSAY OF URINE OSMOLALITY: CPT | Performed by: INTERNAL MEDICINE

## 2021-01-01 PROCEDURE — 99223 1ST HOSP IP/OBS HIGH 75: CPT | Performed by: INTERNAL MEDICINE

## 2021-01-01 PROCEDURE — 95819 EEG AWAKE AND ASLEEP: CPT | Performed by: PSYCHIATRY & NEUROLOGY

## 2021-01-01 PROCEDURE — 80048 BASIC METABOLIC PNL TOTAL CA: CPT | Performed by: PHYSICIAN ASSISTANT

## 2021-01-01 PROCEDURE — 74018 RADEX ABDOMEN 1 VIEW: CPT

## 2021-01-01 PROCEDURE — 81003 URINALYSIS AUTO W/O SCOPE: CPT | Performed by: INTERNAL MEDICINE

## 2021-01-01 PROCEDURE — 84484 ASSAY OF TROPONIN QUANT: CPT | Performed by: EMERGENCY MEDICINE

## 2021-01-01 PROCEDURE — 92950 HEART/LUNG RESUSCITATION CPR: CPT

## 2021-01-01 PROCEDURE — 99233 SBSQ HOSP IP/OBS HIGH 50: CPT | Performed by: FAMILY MEDICINE

## 2021-01-01 PROCEDURE — 82140 ASSAY OF AMMONIA: CPT | Performed by: PHYSICIAN ASSISTANT

## 2021-01-01 PROCEDURE — 25010000002 MORPHINE PER 10 MG: Performed by: INTERNAL MEDICINE

## 2021-01-01 PROCEDURE — 36415 COLL VENOUS BLD VENIPUNCTURE: CPT

## 2021-01-01 PROCEDURE — 70450 CT HEAD/BRAIN W/O DYE: CPT

## 2021-01-01 PROCEDURE — 02HV33Z INSERTION OF INFUSION DEVICE INTO SUPERIOR VENA CAVA, PERCUTANEOUS APPROACH: ICD-10-PCS | Performed by: INTERNAL MEDICINE

## 2021-01-01 PROCEDURE — 83880 ASSAY OF NATRIURETIC PEPTIDE: CPT | Performed by: PHYSICIAN ASSISTANT

## 2021-01-01 PROCEDURE — 87641 MR-STAPH DNA AMP PROBE: CPT | Performed by: INTERNAL MEDICINE

## 2021-01-01 PROCEDURE — 80048 BASIC METABOLIC PNL TOTAL CA: CPT | Performed by: INTERNAL MEDICINE

## 2021-01-01 PROCEDURE — 0DB78ZX EXCISION OF STOMACH, PYLORUS, VIA NATURAL OR ARTIFICIAL OPENING ENDOSCOPIC, DIAGNOSTIC: ICD-10-PCS | Performed by: INTERNAL MEDICINE

## 2021-01-01 PROCEDURE — 25010000002 ONDANSETRON PER 1 MG: Performed by: PHYSICIAN ASSISTANT

## 2021-01-01 PROCEDURE — 83690 ASSAY OF LIPASE: CPT | Performed by: EMERGENCY MEDICINE

## 2021-01-01 RX ORDER — LACTULOSE 10 G/15ML
20 SOLUTION ORAL 3 TIMES DAILY
Status: DISCONTINUED | OUTPATIENT
Start: 2021-01-01 | End: 2021-01-01

## 2021-01-01 RX ORDER — METOPROLOL SUCCINATE 25 MG/1
25 TABLET, EXTENDED RELEASE ORAL
Status: DISCONTINUED | OUTPATIENT
Start: 2021-01-01 | End: 2021-01-01 | Stop reason: HOSPADM

## 2021-01-01 RX ORDER — ONDANSETRON 4 MG/1
4 TABLET, ORALLY DISINTEGRATING ORAL EVERY 8 HOURS PRN
Qty: 12 TABLET | Refills: 0 | Status: ON HOLD | OUTPATIENT
Start: 2021-01-01 | End: 2021-01-01

## 2021-01-01 RX ORDER — ETOMIDATE 2 MG/ML
INJECTION INTRAVENOUS
Status: COMPLETED | OUTPATIENT
Start: 2021-01-01 | End: 2021-01-01

## 2021-01-01 RX ORDER — CALCIUM GLUCONATE 20 MG/ML
1 INJECTION, SOLUTION INTRAVENOUS ONCE
Status: COMPLETED | OUTPATIENT
Start: 2021-01-01 | End: 2021-01-01

## 2021-01-01 RX ORDER — HEPARIN SODIUM 5000 [USP'U]/ML
7500 INJECTION, SOLUTION INTRAVENOUS; SUBCUTANEOUS EVERY 8 HOURS SCHEDULED
Status: DISCONTINUED | OUTPATIENT
Start: 2021-01-01 | End: 2021-01-01

## 2021-01-01 RX ORDER — FUROSEMIDE 10 MG/ML
40 INJECTION INTRAMUSCULAR; INTRAVENOUS ONCE
Status: COMPLETED | OUTPATIENT
Start: 2021-01-01 | End: 2021-01-01

## 2021-01-01 RX ORDER — ONDANSETRON 2 MG/ML
4 INJECTION INTRAMUSCULAR; INTRAVENOUS EVERY 4 HOURS PRN
Status: DISCONTINUED | OUTPATIENT
Start: 2021-01-01 | End: 2021-01-01 | Stop reason: HOSPADM

## 2021-01-01 RX ORDER — DIPHENHYDRAMINE HYDROCHLORIDE AND LIDOCAINE HYDROCHLORIDE AND ALUMINUM HYDROXIDE AND MAGNESIUM HYDRO
10 KIT 4 TIMES DAILY PRN
Status: DISCONTINUED | OUTPATIENT
Start: 2021-01-01 | End: 2021-01-01 | Stop reason: HOSPADM

## 2021-01-01 RX ORDER — LEVETIRACETAM 5 MG/ML
500 INJECTION INTRAVASCULAR EVERY 12 HOURS SCHEDULED
Status: DISCONTINUED | OUTPATIENT
Start: 2021-01-01 | End: 2021-11-24 | Stop reason: HOSPADM

## 2021-01-01 RX ORDER — LIDOCAINE HYDROCHLORIDE 20 MG/ML
INJECTION, SOLUTION INFILTRATION; PERINEURAL AS NEEDED
Status: DISCONTINUED | OUTPATIENT
Start: 2021-01-01 | End: 2021-01-01 | Stop reason: SURG

## 2021-01-01 RX ORDER — HEPARIN SODIUM 5000 [USP'U]/ML
5000 INJECTION, SOLUTION INTRAVENOUS; SUBCUTANEOUS EVERY 12 HOURS SCHEDULED
Status: DISCONTINUED | OUTPATIENT
Start: 2021-01-01 | End: 2021-01-01

## 2021-01-01 RX ORDER — MULTIPLE VITAMINS W/ MINERALS TAB 9MG-400MCG
1 TAB ORAL DAILY
Status: DISCONTINUED | OUTPATIENT
Start: 2021-01-01 | End: 2021-01-01 | Stop reason: HOSPADM

## 2021-01-01 RX ORDER — BUMETANIDE 0.25 MG/ML
2 INJECTION INTRAMUSCULAR; INTRAVENOUS 3 TIMES DAILY
Status: DISCONTINUED | OUTPATIENT
Start: 2021-01-01 | End: 2021-01-01

## 2021-01-01 RX ORDER — LACTULOSE 10 G/15ML
60 SOLUTION ORAL 3 TIMES DAILY
Status: DISCONTINUED | OUTPATIENT
Start: 2021-01-01 | End: 2021-01-01

## 2021-01-01 RX ORDER — KETOROLAC TROMETHAMINE 10 MG/1
10 TABLET, FILM COATED ORAL EVERY 6 HOURS PRN
Qty: 15 TABLET | Refills: 0 | Status: ON HOLD | OUTPATIENT
Start: 2021-01-01 | End: 2021-01-01

## 2021-01-01 RX ORDER — CHOLECALCIFEROL (VITAMIN D3) 125 MCG
5 CAPSULE ORAL NIGHTLY
COMMUNITY

## 2021-01-01 RX ORDER — PANTOPRAZOLE SODIUM 40 MG/1
40 TABLET, DELAYED RELEASE ORAL
Qty: 30 TABLET | Refills: 0 | Status: SHIPPED | OUTPATIENT
Start: 2021-01-01

## 2021-01-01 RX ORDER — LOPERAMIDE HYDROCHLORIDE 2 MG/1
2 CAPSULE ORAL 4 TIMES DAILY PRN
Qty: 12 CAPSULE | Refills: 0 | Status: SHIPPED | OUTPATIENT
Start: 2021-01-01 | End: 2021-01-01

## 2021-01-01 RX ORDER — PANTOPRAZOLE SODIUM 40 MG/10ML
40 INJECTION, POWDER, LYOPHILIZED, FOR SOLUTION INTRAVENOUS
Status: DISCONTINUED | OUTPATIENT
Start: 2021-01-01 | End: 2021-01-01

## 2021-01-01 RX ORDER — SODIUM CHLORIDE 0.9 % (FLUSH) 0.9 %
10 SYRINGE (ML) INJECTION AS NEEDED
Status: DISCONTINUED | OUTPATIENT
Start: 2021-01-01 | End: 2021-01-01 | Stop reason: HOSPADM

## 2021-01-01 RX ORDER — FENTANYL/ROPIVACAINE/NS/PF 2-625MCG/1
15 PLASTIC BAG, INJECTION (ML) EPIDURAL ONCE
Status: COMPLETED | OUTPATIENT
Start: 2021-01-01 | End: 2021-01-01

## 2021-01-01 RX ORDER — NADOLOL 20 MG/1
20 TABLET ORAL
Status: DISCONTINUED | OUTPATIENT
Start: 2021-01-01 | End: 2021-01-01 | Stop reason: HOSPADM

## 2021-01-01 RX ORDER — ONDANSETRON 2 MG/ML
4 INJECTION INTRAMUSCULAR; INTRAVENOUS EVERY 6 HOURS PRN
Status: DISCONTINUED | OUTPATIENT
Start: 2021-01-01 | End: 2021-01-01

## 2021-01-01 RX ORDER — CEFEPIME 1 G/50ML
2 INJECTION, SOLUTION INTRAVENOUS ONCE
Status: COMPLETED | OUTPATIENT
Start: 2021-01-01 | End: 2021-01-01

## 2021-01-01 RX ORDER — AMOXICILLIN 250 MG
2 CAPSULE ORAL 2 TIMES DAILY
Status: DISCONTINUED | OUTPATIENT
Start: 2021-01-01 | End: 2021-01-01 | Stop reason: HOSPADM

## 2021-01-01 RX ORDER — FOLIC ACID 1 MG/1
500 TABLET ORAL DAILY
Status: DISCONTINUED | OUTPATIENT
Start: 2021-01-01 | End: 2021-01-01 | Stop reason: HOSPADM

## 2021-01-01 RX ORDER — EPINEPHRINE 0.1 MG/ML
SYRINGE (ML) INJECTION
Status: COMPLETED | OUTPATIENT
Start: 2021-01-01 | End: 2021-01-01

## 2021-01-01 RX ORDER — MAGNESIUM SULFATE HEPTAHYDRATE 500 MG/ML
INJECTION, SOLUTION INTRAMUSCULAR; INTRAVENOUS
Status: COMPLETED | OUTPATIENT
Start: 2021-01-01 | End: 2021-01-01

## 2021-01-01 RX ORDER — SACCHAROMYCES BOULARDII 250 MG
250 CAPSULE ORAL DAILY
COMMUNITY

## 2021-01-01 RX ORDER — LORAZEPAM 2 MG/ML
1 INJECTION INTRAMUSCULAR ONCE
Status: COMPLETED | OUTPATIENT
Start: 2021-01-01 | End: 2021-01-01

## 2021-01-01 RX ORDER — LORAZEPAM 0.5 MG/1
1 TABLET ORAL EVERY 6 HOURS
Status: DISCONTINUED | OUTPATIENT
Start: 2021-01-01 | End: 2021-01-01

## 2021-01-01 RX ORDER — SODIUM BICARBONATE 42 MG/ML
100 INJECTION, SOLUTION INTRAVENOUS ONCE
Status: DISCONTINUED | OUTPATIENT
Start: 2021-01-01 | End: 2021-01-01

## 2021-01-01 RX ORDER — FENTANYL CITRATE-0.9 % NACL/PF 10 MCG/ML
50-300 PLASTIC BAG, INJECTION (ML) INTRAVENOUS
Status: DISCONTINUED | OUTPATIENT
Start: 2021-01-01 | End: 2021-01-01

## 2021-01-01 RX ORDER — CHOLECALCIFEROL (VITAMIN D3) 125 MCG
5 CAPSULE ORAL NIGHTLY PRN
Status: DISCONTINUED | OUTPATIENT
Start: 2021-01-01 | End: 2021-01-01 | Stop reason: HOSPADM

## 2021-01-01 RX ORDER — CEFEPIME 1 G/50ML
2 INJECTION, SOLUTION INTRAVENOUS EVERY 8 HOURS
Status: DISCONTINUED | OUTPATIENT
Start: 2021-01-01 | End: 2021-01-01

## 2021-01-01 RX ORDER — PROPOFOL 10 MG/ML
VIAL (ML) INTRAVENOUS AS NEEDED
Status: DISCONTINUED | OUTPATIENT
Start: 2021-01-01 | End: 2021-01-01 | Stop reason: SURG

## 2021-01-01 RX ORDER — CARVEDILOL 12.5 MG/1
12.5 TABLET ORAL 2 TIMES DAILY
Qty: 60 TABLET | Refills: 11 | Status: SHIPPED | OUTPATIENT
Start: 2021-01-01 | End: 2021-01-01 | Stop reason: HOSPADM

## 2021-01-01 RX ORDER — MAGNESIUM SULFATE HEPTAHYDRATE 40 MG/ML
4 INJECTION, SOLUTION INTRAVENOUS ONCE
Status: COMPLETED | OUTPATIENT
Start: 2021-01-01 | End: 2021-01-01

## 2021-01-01 RX ORDER — LORAZEPAM 2 MG/ML
2 INJECTION INTRAMUSCULAR
Status: DISCONTINUED | OUTPATIENT
Start: 2021-01-01 | End: 2021-01-01 | Stop reason: HOSPADM

## 2021-01-01 RX ORDER — LORAZEPAM 2 MG/ML
4 INJECTION INTRAMUSCULAR
Status: COMPLETED | OUTPATIENT
Start: 2021-01-01 | End: 2021-01-01

## 2021-01-01 RX ORDER — LEVOFLOXACIN 750 MG/1
750 TABLET ORAL EVERY 24 HOURS
Qty: 2 TABLET | Refills: 0 | Status: SHIPPED | OUTPATIENT
Start: 2021-01-01 | End: 2021-01-01

## 2021-01-01 RX ORDER — CALCIUM CARBONATE 200(500)MG
2 TABLET,CHEWABLE ORAL 2 TIMES DAILY PRN
Status: DISCONTINUED | OUTPATIENT
Start: 2021-01-01 | End: 2021-01-01 | Stop reason: HOSPADM

## 2021-01-01 RX ORDER — BUDESONIDE 0.5 MG/2ML
0.5 INHALANT ORAL
Status: DISCONTINUED | OUTPATIENT
Start: 2021-01-01 | End: 2021-01-01 | Stop reason: HOSPADM

## 2021-01-01 RX ORDER — LORAZEPAM 2 MG/ML
1 INJECTION INTRAMUSCULAR
Status: DISCONTINUED | OUTPATIENT
Start: 2021-01-01 | End: 2021-01-01 | Stop reason: HOSPADM

## 2021-01-01 RX ORDER — PANTOPRAZOLE SODIUM 40 MG/1
40 TABLET, DELAYED RELEASE ORAL
Status: DISCONTINUED | OUTPATIENT
Start: 2021-01-01 | End: 2021-01-01 | Stop reason: HOSPADM

## 2021-01-01 RX ORDER — LORAZEPAM 2 MG/1
2 TABLET ORAL
Status: DISCONTINUED | OUTPATIENT
Start: 2021-01-01 | End: 2021-01-01 | Stop reason: HOSPADM

## 2021-01-01 RX ORDER — SODIUM CHLORIDE 9 MG/ML
100 INJECTION, SOLUTION INTRAVENOUS CONTINUOUS
Status: DISCONTINUED | OUTPATIENT
Start: 2021-01-01 | End: 2021-01-01

## 2021-01-01 RX ORDER — LORAZEPAM 2 MG/ML
2 INJECTION INTRAMUSCULAR
Status: DISCONTINUED | OUTPATIENT
Start: 2021-01-01 | End: 2021-11-24 | Stop reason: HOSPADM

## 2021-01-01 RX ORDER — POLYETHYLENE GLYCOL 3350 17 G/17G
17 POWDER, FOR SOLUTION ORAL DAILY PRN
Status: DISCONTINUED | OUTPATIENT
Start: 2021-01-01 | End: 2021-01-01 | Stop reason: HOSPADM

## 2021-01-01 RX ORDER — CHLORAL HYDRATE 500 MG
1000 CAPSULE ORAL EVERY EVENING
COMMUNITY

## 2021-01-01 RX ORDER — ALUMINA, MAGNESIA, AND SIMETHICONE 2400; 2400; 240 MG/30ML; MG/30ML; MG/30ML
15 SUSPENSION ORAL EVERY 6 HOURS PRN
Status: DISCONTINUED | OUTPATIENT
Start: 2021-01-01 | End: 2021-01-01

## 2021-01-01 RX ORDER — SODIUM CHLORIDE 0.9 % (FLUSH) 0.9 %
10 SYRINGE (ML) INJECTION AS NEEDED
Status: DISCONTINUED | OUTPATIENT
Start: 2021-01-01 | End: 2021-11-24 | Stop reason: HOSPADM

## 2021-01-01 RX ORDER — HALOPERIDOL 5 MG/ML
2 INJECTION INTRAMUSCULAR ONCE
Status: DISCONTINUED | OUTPATIENT
Start: 2021-01-01 | End: 2021-01-01

## 2021-01-01 RX ORDER — ROCURONIUM BROMIDE 10 MG/ML
100 INJECTION, SOLUTION INTRAVENOUS ONCE
Status: COMPLETED | OUTPATIENT
Start: 2021-01-01 | End: 2021-01-01

## 2021-01-01 RX ORDER — MORPHINE SULFATE 2 MG/ML
2 INJECTION, SOLUTION INTRAMUSCULAR; INTRAVENOUS
Status: DISCONTINUED | OUTPATIENT
Start: 2021-01-01 | End: 2021-11-24 | Stop reason: HOSPADM

## 2021-01-01 RX ORDER — FUROSEMIDE 10 MG/ML
40 INJECTION INTRAMUSCULAR; INTRAVENOUS EVERY 12 HOURS
Status: DISCONTINUED | OUTPATIENT
Start: 2021-01-01 | End: 2021-01-01 | Stop reason: HOSPADM

## 2021-01-01 RX ORDER — SODIUM CHLORIDE 0.9 % (FLUSH) 0.9 %
10 SYRINGE (ML) INJECTION EVERY 12 HOURS SCHEDULED
Status: DISCONTINUED | OUTPATIENT
Start: 2021-01-01 | End: 2021-11-24 | Stop reason: HOSPADM

## 2021-01-01 RX ORDER — ALBUTEROL SULFATE 2.5 MG/3ML
2.5 SOLUTION RESPIRATORY (INHALATION) EVERY 4 HOURS PRN
Status: DISCONTINUED | OUTPATIENT
Start: 2021-01-01 | End: 2021-01-01 | Stop reason: HOSPADM

## 2021-01-01 RX ORDER — LEVOFLOXACIN 750 MG/1
750 TABLET ORAL EVERY 24 HOURS
Status: DISCONTINUED | OUTPATIENT
Start: 2021-01-01 | End: 2021-01-01 | Stop reason: HOSPADM

## 2021-01-01 RX ORDER — NOREPINEPHRINE BIT/0.9 % NACL 8 MG/250ML
.02-.5 INFUSION BOTTLE (ML) INTRAVENOUS
Status: DISCONTINUED | OUTPATIENT
Start: 2021-01-01 | End: 2021-01-01

## 2021-01-01 RX ORDER — LIDOCAINE HYDROCHLORIDE 20 MG/ML
INJECTION, SOLUTION INFILTRATION; PERINEURAL
Status: DISCONTINUED
Start: 2021-01-01 | End: 2021-01-01 | Stop reason: WASHOUT

## 2021-01-01 RX ORDER — POTASSIUM CHLORIDE 750 MG/1
40 CAPSULE, EXTENDED RELEASE ORAL 2 TIMES DAILY WITH MEALS
Status: COMPLETED | OUTPATIENT
Start: 2021-01-01 | End: 2021-01-01

## 2021-01-01 RX ORDER — FLUTICASONE PROPIONATE 50 MCG
2 SPRAY, SUSPENSION (ML) NASAL DAILY
Status: DISCONTINUED | OUTPATIENT
Start: 2021-01-01 | End: 2021-01-01 | Stop reason: HOSPADM

## 2021-01-01 RX ORDER — FUROSEMIDE 40 MG/1
40 TABLET ORAL DAILY
Qty: 30 TABLET | Refills: 0 | Status: SHIPPED | OUTPATIENT
Start: 2021-01-01

## 2021-01-01 RX ORDER — CHOLECALCIFEROL (VITAMIN D3) 125 MCG
5 CAPSULE ORAL NIGHTLY
Status: DISCONTINUED | OUTPATIENT
Start: 2021-01-01 | End: 2021-01-01

## 2021-01-01 RX ORDER — BUMETANIDE 0.25 MG/ML
4 INJECTION INTRAMUSCULAR; INTRAVENOUS 3 TIMES DAILY
Status: DISCONTINUED | OUTPATIENT
Start: 2021-01-01 | End: 2021-01-01

## 2021-01-01 RX ORDER — SODIUM CHLORIDE 9 MG/ML
30 INJECTION, SOLUTION INTRAVENOUS CONTINUOUS
Status: DISCONTINUED | OUTPATIENT
Start: 2021-01-01 | End: 2021-01-01

## 2021-01-01 RX ORDER — LORAZEPAM 2 MG/ML
4 INJECTION INTRAMUSCULAR EVERY 4 HOURS
Status: DISCONTINUED | OUTPATIENT
Start: 2021-01-01 | End: 2021-01-01 | Stop reason: DRUGHIGH

## 2021-01-01 RX ORDER — LORAZEPAM 2 MG/ML
4 INJECTION INTRAMUSCULAR EVERY 4 HOURS PRN
Status: DISCONTINUED | OUTPATIENT
Start: 2021-01-01 | End: 2021-01-01

## 2021-01-01 RX ORDER — LORAZEPAM 2 MG/ML
2 INJECTION INTRAMUSCULAR
Status: DISCONTINUED | OUTPATIENT
Start: 2021-01-01 | End: 2021-01-01

## 2021-01-01 RX ORDER — IPRATROPIUM BROMIDE AND ALBUTEROL SULFATE 2.5; .5 MG/3ML; MG/3ML
3 SOLUTION RESPIRATORY (INHALATION)
Status: DISCONTINUED | OUTPATIENT
Start: 2021-01-01 | End: 2021-01-01

## 2021-01-01 RX ORDER — HYDROXYZINE HYDROCHLORIDE 25 MG/1
25 TABLET, FILM COATED ORAL 3 TIMES DAILY PRN
Status: DISCONTINUED | OUTPATIENT
Start: 2021-01-01 | End: 2021-01-01 | Stop reason: HOSPADM

## 2021-01-01 RX ORDER — METOPROLOL SUCCINATE 50 MG/1
50 TABLET, EXTENDED RELEASE ORAL
COMMUNITY
End: 2021-01-01 | Stop reason: HOSPADM

## 2021-01-01 RX ORDER — ECHINACEA PURPUREA EXTRACT 125 MG
2 TABLET ORAL 4 TIMES DAILY
Status: DISCONTINUED | OUTPATIENT
Start: 2021-01-01 | End: 2021-01-01 | Stop reason: HOSPADM

## 2021-01-01 RX ORDER — BISACODYL 5 MG/1
5 TABLET, DELAYED RELEASE ORAL DAILY PRN
Status: DISCONTINUED | OUTPATIENT
Start: 2021-01-01 | End: 2021-01-01 | Stop reason: HOSPADM

## 2021-01-01 RX ORDER — CEPHALEXIN 500 MG/1
500 CAPSULE ORAL 4 TIMES DAILY
Qty: 40 CAPSULE | Refills: 0 | Status: SHIPPED | OUTPATIENT
Start: 2021-01-01 | End: 2021-01-01

## 2021-01-01 RX ORDER — MULTIPLE VITAMINS W/ MINERALS TAB 9MG-400MCG
1 TAB ORAL DAILY
Status: DISCONTINUED | OUTPATIENT
Start: 2021-01-01 | End: 2021-01-01

## 2021-01-01 RX ORDER — MIDAZOLAM IN NACL,ISO-OSMOT/PF 50 MG/50ML
1 INFUSION BOTTLE (ML) INTRAVENOUS
Status: DISCONTINUED | OUTPATIENT
Start: 2021-01-01 | End: 2021-11-24 | Stop reason: HOSPADM

## 2021-01-01 RX ORDER — SODIUM CHLORIDE, SODIUM LACTATE, POTASSIUM CHLORIDE, CALCIUM CHLORIDE 600; 310; 30; 20 MG/100ML; MG/100ML; MG/100ML; MG/100ML
30 INJECTION, SOLUTION INTRAVENOUS CONTINUOUS
Status: DISCONTINUED | OUTPATIENT
Start: 2021-01-01 | End: 2021-01-01 | Stop reason: HOSPADM

## 2021-01-01 RX ORDER — TRAZODONE HYDROCHLORIDE 50 MG/1
50 TABLET ORAL NIGHTLY
COMMUNITY

## 2021-01-01 RX ORDER — LORAZEPAM 0.5 MG/1
1 TABLET ORAL EVERY 8 HOURS
Status: DISCONTINUED | OUTPATIENT
Start: 2021-01-01 | End: 2021-01-01

## 2021-01-01 RX ORDER — ARFORMOTEROL TARTRATE 15 UG/2ML
15 SOLUTION RESPIRATORY (INHALATION)
Status: DISCONTINUED | OUTPATIENT
Start: 2021-01-01 | End: 2021-01-01 | Stop reason: HOSPADM

## 2021-01-01 RX ORDER — LORAZEPAM 0.5 MG/1
1 TABLET ORAL
Status: DISCONTINUED | OUTPATIENT
Start: 2021-01-01 | End: 2021-01-01 | Stop reason: HOSPADM

## 2021-01-01 RX ORDER — ACETAMINOPHEN 325 MG/1
650 TABLET ORAL EVERY 4 HOURS PRN
Status: DISCONTINUED | OUTPATIENT
Start: 2021-01-01 | End: 2021-01-01 | Stop reason: HOSPADM

## 2021-01-01 RX ORDER — CEFTRIAXONE SODIUM 2 G/50ML
2 INJECTION, SOLUTION INTRAVENOUS NIGHTLY
Status: DISCONTINUED | OUTPATIENT
Start: 2021-01-01 | End: 2021-01-01

## 2021-01-01 RX ORDER — FENTANYL/ROPIVACAINE/NS/PF 2-625MCG/1
15 PLASTIC BAG, INJECTION (ML) EPIDURAL
Status: COMPLETED | OUTPATIENT
Start: 2021-01-01 | End: 2021-01-01

## 2021-01-01 RX ORDER — ONDANSETRON 2 MG/ML
4 INJECTION INTRAMUSCULAR; INTRAVENOUS ONCE
Status: COMPLETED | OUTPATIENT
Start: 2021-01-01 | End: 2021-01-01

## 2021-01-01 RX ORDER — OMEPRAZOLE 20 MG/1
20 CAPSULE, DELAYED RELEASE ORAL DAILY PRN
COMMUNITY

## 2021-01-01 RX ORDER — DICYCLOMINE HCL 20 MG
20 TABLET ORAL EVERY 6 HOURS
Qty: 20 TABLET | Refills: 0 | Status: ON HOLD | OUTPATIENT
Start: 2021-01-01 | End: 2021-01-01

## 2021-01-01 RX ORDER — PANTOPRAZOLE SODIUM 40 MG/1
40 TABLET, DELAYED RELEASE ORAL
Status: DISCONTINUED | OUTPATIENT
Start: 2021-01-01 | End: 2021-01-01

## 2021-01-01 RX ORDER — ACETAZOLAMIDE 500 MG/5ML
250 INJECTION, POWDER, LYOPHILIZED, FOR SOLUTION INTRAVENOUS ONCE
Status: COMPLETED | OUTPATIENT
Start: 2021-01-01 | End: 2021-01-01

## 2021-01-01 RX ORDER — MIDAZOLAM HYDROCHLORIDE 2 MG/2ML
4 INJECTION, SOLUTION INTRAMUSCULAR; INTRAVENOUS ONCE
Status: COMPLETED | OUTPATIENT
Start: 2021-01-01 | End: 2021-01-01

## 2021-01-01 RX ORDER — CEFTRIAXONE SODIUM 2 G/50ML
2 INJECTION, SOLUTION INTRAVENOUS EVERY 24 HOURS
Status: DISCONTINUED | OUTPATIENT
Start: 2021-01-01 | End: 2021-01-01

## 2021-01-01 RX ORDER — TRAZODONE HYDROCHLORIDE 100 MG/1
100 TABLET ORAL NIGHTLY PRN
Status: DISCONTINUED | OUTPATIENT
Start: 2021-01-01 | End: 2021-01-01 | Stop reason: HOSPADM

## 2021-01-01 RX ORDER — CARVEDILOL 6.25 MG/1
6.25 TABLET ORAL 2 TIMES DAILY WITH MEALS
Qty: 60 TABLET | Refills: 0 | Status: SHIPPED | OUTPATIENT
Start: 2021-01-01 | End: 2021-12-16

## 2021-01-01 RX ORDER — HALOPERIDOL 5 MG/ML
2 INJECTION INTRAMUSCULAR ONCE
Status: COMPLETED | OUTPATIENT
Start: 2021-01-01 | End: 2021-01-01

## 2021-01-01 RX ORDER — PEDI MULTIVIT 17/IRON FUMARATE 15 MG
1 TABLET,CHEWABLE ORAL
Status: DISCONTINUED | OUTPATIENT
Start: 2021-01-01 | End: 2021-01-01

## 2021-01-01 RX ORDER — BISACODYL 10 MG
10 SUPPOSITORY, RECTAL RECTAL DAILY PRN
Status: DISCONTINUED | OUTPATIENT
Start: 2021-01-01 | End: 2021-01-01 | Stop reason: HOSPADM

## 2021-01-01 RX ORDER — SODIUM CHLORIDE 0.9 % (FLUSH) 0.9 %
10 SYRINGE (ML) INJECTION EVERY 12 HOURS SCHEDULED
Status: DISCONTINUED | OUTPATIENT
Start: 2021-01-01 | End: 2021-01-01 | Stop reason: HOSPADM

## 2021-01-01 RX ORDER — CEFTRIAXONE SODIUM 2 G/50ML
2 INJECTION, SOLUTION INTRAVENOUS ONCE
Status: COMPLETED | OUTPATIENT
Start: 2021-01-01 | End: 2021-01-01

## 2021-01-01 RX ORDER — ECHINACEA PURPUREA EXTRACT 125 MG
2 TABLET ORAL AS NEEDED
Status: DISCONTINUED | OUTPATIENT
Start: 2021-01-01 | End: 2021-01-01 | Stop reason: HOSPADM

## 2021-01-01 RX ORDER — BUMETANIDE 0.25 MG/ML
3 INJECTION INTRAMUSCULAR; INTRAVENOUS 3 TIMES DAILY
Status: DISCONTINUED | OUTPATIENT
Start: 2021-01-01 | End: 2021-01-01

## 2021-01-01 RX ADMIN — POTASSIUM CHLORIDE 40 MEQ: 750 CAPSULE, EXTENDED RELEASE ORAL at 17:38

## 2021-01-01 RX ADMIN — FLUTICASONE PROPIONATE 2 SPRAY: 50 SPRAY, METERED NASAL at 08:10

## 2021-01-01 RX ADMIN — NOREPINEPHRINE BITARTRATE 0.02 MCG/KG/MIN: 1 INJECTION, SOLUTION, CONCENTRATE INTRAVENOUS at 00:33

## 2021-01-01 RX ADMIN — CEFEPIME 2 G: 1 INJECTION, SOLUTION INTRAVENOUS at 05:18

## 2021-01-01 RX ADMIN — LEVOFLOXACIN 750 MG: 750 TABLET, FILM COATED ORAL at 13:54

## 2021-01-01 RX ADMIN — PHENYLEPHRINE HYDROCHLORIDE 2 SPRAY: 0.25 SPRAY NASAL at 18:06

## 2021-01-01 RX ADMIN — SODIUM CHLORIDE, PRESERVATIVE FREE 10 ML: 5 INJECTION INTRAVENOUS at 08:26

## 2021-01-01 RX ADMIN — ENOXAPARIN SODIUM 30 MG: 100 INJECTION SUBCUTANEOUS at 10:47

## 2021-01-01 RX ADMIN — LACTULOSE 60 G: 20 SOLUTION ORAL at 15:56

## 2021-01-01 RX ADMIN — MULTIPLE VITAMINS W/ MINERALS TAB 1 TABLET: TAB at 09:02

## 2021-01-01 RX ADMIN — SALINE NASAL SPRAY 2 SPRAY: 1.5 SOLUTION NASAL at 17:28

## 2021-01-01 RX ADMIN — SODIUM CHLORIDE, PRESERVATIVE FREE 10 ML: 5 INJECTION INTRAVENOUS at 08:00

## 2021-01-01 RX ADMIN — ACETAMINOPHEN 650 MG: 325 TABLET ORAL at 02:58

## 2021-01-01 RX ADMIN — IPRATROPIUM BROMIDE AND ALBUTEROL SULFATE 3 ML: .5; 2.5 SOLUTION RESPIRATORY (INHALATION) at 19:36

## 2021-01-01 RX ADMIN — PROPOFOL 50 MCG/KG/MIN: 10 INJECTION, EMULSION INTRAVENOUS at 15:08

## 2021-01-01 RX ADMIN — SODIUM CHLORIDE, POTASSIUM CHLORIDE, SODIUM LACTATE AND CALCIUM CHLORIDE 30 ML/HR: 600; 310; 30; 20 INJECTION, SOLUTION INTRAVENOUS at 13:30

## 2021-01-01 RX ADMIN — Medication 5 MG: at 03:31

## 2021-01-01 RX ADMIN — ACETAMINOPHEN 650 MG: 325 TABLET ORAL at 05:56

## 2021-01-01 RX ADMIN — POTASSIUM PHOSPHATE, MONOBASIC AND POTASSIUM PHOSPHATE, DIBASIC 15 MMOL: 224; 236 INJECTION, SOLUTION, CONCENTRATE INTRAVENOUS at 17:59

## 2021-01-01 RX ADMIN — CEFEPIME 2 G: 1 INJECTION, SOLUTION INTRAVENOUS at 20:00

## 2021-01-01 RX ADMIN — HYDROXYZINE HYDROCHLORIDE 25 MG: 25 TABLET, FILM COATED ORAL at 21:20

## 2021-01-01 RX ADMIN — SODIUM CHLORIDE 100 ML/HR: 9 INJECTION, SOLUTION INTRAVENOUS at 18:28

## 2021-01-01 RX ADMIN — FUROSEMIDE 40 MG: 10 INJECTION INTRAMUSCULAR; INTRAVENOUS at 12:23

## 2021-01-01 RX ADMIN — BUMETANIDE 2 MG: 0.25 INJECTION, SOLUTION INTRAMUSCULAR; INTRAVENOUS at 21:46

## 2021-01-01 RX ADMIN — PROPOFOL 50 MCG/KG/MIN: 10 INJECTION, EMULSION INTRAVENOUS at 02:54

## 2021-01-01 RX ADMIN — CEFEPIME 2 G: 1 INJECTION, SOLUTION INTRAVENOUS at 05:43

## 2021-01-01 RX ADMIN — ENOXAPARIN SODIUM 40 MG: 40 INJECTION SUBCUTANEOUS at 19:56

## 2021-01-01 RX ADMIN — SODIUM CHLORIDE, PRESERVATIVE FREE 10 ML: 5 INJECTION INTRAVENOUS at 08:27

## 2021-01-01 RX ADMIN — LORAZEPAM 1 MG: 2 INJECTION INTRAMUSCULAR; INTRAVENOUS at 18:22

## 2021-01-01 RX ADMIN — SALINE NASAL SPRAY 2 SPRAY: 1.5 SOLUTION NASAL at 12:14

## 2021-01-01 RX ADMIN — PROPOFOL 110 MG: 10 INJECTION, EMULSION INTRAVENOUS at 14:30

## 2021-01-01 RX ADMIN — Medication 5 MG: at 21:06

## 2021-01-01 RX ADMIN — PROPOFOL 50 MCG/KG/MIN: 10 INJECTION, EMULSION INTRAVENOUS at 10:47

## 2021-01-01 RX ADMIN — ACETAMINOPHEN 650 MG: 325 TABLET ORAL at 20:08

## 2021-01-01 RX ADMIN — FUROSEMIDE 40 MG: 10 INJECTION INTRAMUSCULAR; INTRAVENOUS at 06:17

## 2021-01-01 RX ADMIN — ACETAMINOPHEN 650 MG: 325 TABLET ORAL at 15:21

## 2021-01-01 RX ADMIN — CEFEPIME 2 G: 1 INJECTION, SOLUTION INTRAVENOUS at 12:40

## 2021-01-01 RX ADMIN — FOLIC ACID 500 MCG: 1 TABLET ORAL at 09:02

## 2021-01-01 RX ADMIN — LORAZEPAM 4 MG: 2 INJECTION INTRAMUSCULAR; INTRAVENOUS at 23:32

## 2021-01-01 RX ADMIN — PROPOFOL 50 MCG/KG/MIN: 10 INJECTION, EMULSION INTRAVENOUS at 05:36

## 2021-01-01 RX ADMIN — PROPOFOL 50 MCG/KG/MIN: 10 INJECTION, EMULSION INTRAVENOUS at 13:54

## 2021-01-01 RX ADMIN — LORAZEPAM 4 MG: 2 INJECTION INTRAMUSCULAR; INTRAVENOUS at 10:47

## 2021-01-01 RX ADMIN — PROPOFOL 50 MCG/KG/MIN: 10 INJECTION, EMULSION INTRAVENOUS at 17:08

## 2021-01-01 RX ADMIN — SALINE NASAL SPRAY 2 SPRAY: 1.5 SOLUTION NASAL at 08:17

## 2021-01-01 RX ADMIN — Medication 100 MG: at 18:08

## 2021-01-01 RX ADMIN — Medication 1 TABLET: at 08:16

## 2021-01-01 RX ADMIN — POTASSIUM PHOSPHATE, MONOBASIC AND POTASSIUM PHOSPHATE, DIBASIC 15 MMOL: 224; 236 INJECTION, SOLUTION, CONCENTRATE INTRAVENOUS at 06:14

## 2021-01-01 RX ADMIN — Medication 100 MG: at 08:08

## 2021-01-01 RX ADMIN — NOREPINEPHRINE BITARTRATE 0.02 MCG/KG/MIN: 1 INJECTION, SOLUTION, CONCENTRATE INTRAVENOUS at 01:22

## 2021-01-01 RX ADMIN — MULTIPLE VITAMINS W/ MINERALS TAB 1 TABLET: TAB at 08:10

## 2021-01-01 RX ADMIN — SODIUM CHLORIDE, PRESERVATIVE FREE 10 ML: 5 INJECTION INTRAVENOUS at 09:15

## 2021-01-01 RX ADMIN — IOPAMIDOL 100 ML: 755 INJECTION, SOLUTION INTRAVENOUS at 13:32

## 2021-01-01 RX ADMIN — BUMETANIDE 4 MG: 0.25 INJECTION, SOLUTION INTRAMUSCULAR; INTRAVENOUS at 08:04

## 2021-01-01 RX ADMIN — METOPROLOL SUCCINATE 25 MG: 25 TABLET, EXTENDED RELEASE ORAL at 08:08

## 2021-01-01 RX ADMIN — POTASSIUM CHLORIDE 40 MEQ: 750 CAPSULE, EXTENDED RELEASE ORAL at 09:37

## 2021-01-01 RX ADMIN — LACTULOSE 60 G: 20 SOLUTION ORAL at 09:06

## 2021-01-01 RX ADMIN — PROPOFOL 50 MCG/KG/MIN: 10 INJECTION, EMULSION INTRAVENOUS at 18:15

## 2021-01-01 RX ADMIN — PROPOFOL 50 MCG/KG/MIN: 10 INJECTION, EMULSION INTRAVENOUS at 08:14

## 2021-01-01 RX ADMIN — ACETAMINOPHEN 650 MG: 325 TABLET ORAL at 18:05

## 2021-01-01 RX ADMIN — NOREPINEPHRINE BITARTRATE 0.16 MCG/KG/MIN: 1 INJECTION, SOLUTION, CONCENTRATE INTRAVENOUS at 14:39

## 2021-01-01 RX ADMIN — PROPOFOL 25 MCG/KG/MIN: 10 INJECTION, EMULSION INTRAVENOUS at 02:49

## 2021-01-01 RX ADMIN — LACTULOSE 60 G: 20 SOLUTION ORAL at 09:13

## 2021-01-01 RX ADMIN — HEPARIN SODIUM 5000 UNITS: 5000 INJECTION, SOLUTION INTRAVENOUS; SUBCUTANEOUS at 21:46

## 2021-01-01 RX ADMIN — CEFTRIAXONE SODIUM 2 G: 2 INJECTION, SOLUTION INTRAVENOUS at 21:10

## 2021-01-01 RX ADMIN — CEFTRIAXONE SODIUM 2 G: 2 INJECTION, SOLUTION INTRAVENOUS at 01:33

## 2021-01-01 RX ADMIN — FUROSEMIDE 40 MG: 10 INJECTION INTRAMUSCULAR; INTRAVENOUS at 02:03

## 2021-01-01 RX ADMIN — MORPHINE SULFATE 4 MG: 4 INJECTION, SOLUTION INTRAMUSCULAR; INTRAVENOUS at 13:04

## 2021-01-01 RX ADMIN — LORAZEPAM 1 MG: 2 INJECTION INTRAMUSCULAR; INTRAVENOUS at 20:39

## 2021-01-01 RX ADMIN — FOLIC ACID 500 MCG: 1 TABLET ORAL at 19:02

## 2021-01-01 RX ADMIN — Medication 5 MG: at 20:04

## 2021-01-01 RX ADMIN — POTASSIUM & SODIUM PHOSPHATES POWDER PACK 280-160-250 MG 2 PACKET: 280-160-250 PACK at 03:26

## 2021-01-01 RX ADMIN — SODIUM BICARBONATE 150 MEQ: 84 INJECTION, SOLUTION INTRAVENOUS at 02:15

## 2021-01-01 RX ADMIN — PHENYLEPHRINE HYDROCHLORIDE 2 SPRAY: 0.25 SPRAY NASAL at 21:15

## 2021-01-01 RX ADMIN — Medication 100 MG: at 08:21

## 2021-01-01 RX ADMIN — SODIUM BICARBONATE 100 MEQ: 84 INJECTION, SOLUTION INTRAVENOUS at 02:14

## 2021-01-01 RX ADMIN — SALINE NASAL SPRAY 2 SPRAY: 1.5 SOLUTION NASAL at 15:23

## 2021-01-01 RX ADMIN — PANTOPRAZOLE SODIUM 40 MG: 40 TABLET, DELAYED RELEASE ORAL at 16:14

## 2021-01-01 RX ADMIN — SENNOSIDES AND DOCUSATE SODIUM 2 TABLET: 50; 8.6 TABLET ORAL at 21:07

## 2021-01-01 RX ADMIN — SALINE NASAL SPRAY 2 SPRAY: 1.5 SOLUTION NASAL at 20:26

## 2021-01-01 RX ADMIN — Medication 100 MG: at 19:01

## 2021-01-01 RX ADMIN — MIDAZOLAM 1 MG/HR: 5 INJECTION, SOLUTION INTRAMUSCULAR; INTRAVENOUS at 01:24

## 2021-01-01 RX ADMIN — FLUTICASONE PROPIONATE 2 SPRAY: 50 SPRAY, METERED NASAL at 08:18

## 2021-01-01 RX ADMIN — MULTIPLE VITAMINS W/ MINERALS TAB 1 TABLET: TAB at 19:02

## 2021-01-01 RX ADMIN — TRAZODONE HYDROCHLORIDE 100 MG: 100 TABLET ORAL at 20:00

## 2021-01-01 RX ADMIN — SENNOSIDES AND DOCUSATE SODIUM 2 TABLET: 50; 8.6 TABLET ORAL at 08:07

## 2021-01-01 RX ADMIN — DIPHENHYDRAMINE HYDROCHLORIDE AND LIDOCAINE HYDROCHLORIDE AND ALUMINUM HYDROXIDE AND MAGNESIUM HYDRO 10 ML: KIT at 20:25

## 2021-01-01 RX ADMIN — LEVETIRACETAM 500 MG: 5 INJECTION, SOLUTION INTRAVENOUS at 08:05

## 2021-01-01 RX ADMIN — CEFEPIME 2 G: 1 INJECTION, SOLUTION INTRAVENOUS at 12:24

## 2021-01-01 RX ADMIN — LIDOCAINE HYDROCHLORIDE 50 MG: 20 INJECTION, SOLUTION INFILTRATION; PERINEURAL at 14:27

## 2021-01-01 RX ADMIN — PANTOPRAZOLE SODIUM 40 MG: 40 INJECTION, POWDER, FOR SOLUTION INTRAVENOUS at 05:10

## 2021-01-01 RX ADMIN — MORPHINE SULFATE 2 MG: 2 INJECTION, SOLUTION INTRAMUSCULAR; INTRAVENOUS at 20:09

## 2021-01-01 RX ADMIN — SULFUR HEXAFLUORIDE 2 ML: KIT at 12:41

## 2021-01-01 RX ADMIN — ETOMIDATE 20 MG: 40 INJECTION, SOLUTION INTRAVENOUS at 21:19

## 2021-01-01 RX ADMIN — LORAZEPAM 4 MG: 2 INJECTION INTRAMUSCULAR; INTRAVENOUS at 10:54

## 2021-01-01 RX ADMIN — PHENYLEPHRINE HYDROCHLORIDE 2 SPRAY: 0.25 SPRAY NASAL at 08:17

## 2021-01-01 RX ADMIN — NOREPINEPHRINE BITARTRATE 0.13 MCG/KG/MIN: 1 INJECTION, SOLUTION, CONCENTRATE INTRAVENOUS at 01:22

## 2021-01-01 RX ADMIN — BUDESONIDE 0.5 MG: 0.5 SUSPENSION RESPIRATORY (INHALATION) at 19:36

## 2021-01-01 RX ADMIN — HYDROXYZINE HYDROCHLORIDE 25 MG: 25 TABLET, FILM COATED ORAL at 01:35

## 2021-01-01 RX ADMIN — LACTULOSE 60 G: 20 SOLUTION ORAL at 08:00

## 2021-01-01 RX ADMIN — LACTULOSE 60 G: 20 SOLUTION ORAL at 01:12

## 2021-01-01 RX ADMIN — PHENYLEPHRINE HYDROCHLORIDE 2 SPRAY: 0.25 SPRAY NASAL at 16:23

## 2021-01-01 RX ADMIN — BUMETANIDE 2 MG: 0.25 INJECTION, SOLUTION INTRAMUSCULAR; INTRAVENOUS at 13:02

## 2021-01-01 RX ADMIN — MIDAZOLAM 8 MG/HR: 5 INJECTION, SOLUTION INTRAMUSCULAR; INTRAVENOUS at 01:54

## 2021-01-01 RX ADMIN — LEVETIRACETAM 500 MG: 5 INJECTION, SOLUTION INTRAVENOUS at 21:10

## 2021-01-01 RX ADMIN — PROPOFOL 50 MCG/KG/MIN: 10 INJECTION, EMULSION INTRAVENOUS at 00:54

## 2021-01-01 RX ADMIN — PROPOFOL 30 MCG/KG/MIN: 10 INJECTION, EMULSION INTRAVENOUS at 06:36

## 2021-01-01 RX ADMIN — LORAZEPAM 1 MG: 0.5 TABLET ORAL at 23:57

## 2021-01-01 RX ADMIN — FLUTICASONE PROPIONATE 2 SPRAY: 50 SPRAY, METERED NASAL at 12:24

## 2021-01-01 RX ADMIN — DIPHENHYDRAMINE HYDROCHLORIDE AND LIDOCAINE HYDROCHLORIDE AND ALUMINUM HYDROXIDE AND MAGNESIUM HYDRO 10 ML: KIT at 18:06

## 2021-01-01 RX ADMIN — POTASSIUM PHOSPHATE, MONOBASIC AND POTASSIUM PHOSPHATE, DIBASIC 15 MMOL: 224; 236 INJECTION, SOLUTION, CONCENTRATE INTRAVENOUS at 10:25

## 2021-01-01 RX ADMIN — PROPOFOL 50 MCG/KG/MIN: 10 INJECTION, EMULSION INTRAVENOUS at 04:40

## 2021-01-01 RX ADMIN — ENOXAPARIN SODIUM 40 MG: 40 INJECTION SUBCUTANEOUS at 08:18

## 2021-01-01 RX ADMIN — PROPOFOL 50 MCG/KG/MIN: 10 INJECTION, EMULSION INTRAVENOUS at 11:26

## 2021-01-01 RX ADMIN — FUROSEMIDE 40 MG: 10 INJECTION INTRAMUSCULAR; INTRAVENOUS at 20:25

## 2021-01-01 RX ADMIN — CEFTRIAXONE SODIUM 2 G: 2 INJECTION, SOLUTION INTRAVENOUS at 22:48

## 2021-01-01 RX ADMIN — CEFEPIME 2 G: 1 INJECTION, SOLUTION INTRAVENOUS at 21:38

## 2021-01-01 RX ADMIN — SODIUM CHLORIDE, PRESERVATIVE FREE 10 ML: 5 INJECTION INTRAVENOUS at 09:03

## 2021-01-01 RX ADMIN — MIDAZOLAM HYDROCHLORIDE 4 MG: 1 INJECTION, SOLUTION INTRAMUSCULAR; INTRAVENOUS at 09:05

## 2021-01-01 RX ADMIN — ENOXAPARIN SODIUM 40 MG: 40 INJECTION SUBCUTANEOUS at 21:07

## 2021-01-01 RX ADMIN — VANCOMYCIN HYDROCHLORIDE 1500 MG: 5 INJECTION, POWDER, LYOPHILIZED, FOR SOLUTION INTRAVENOUS at 08:25

## 2021-01-01 RX ADMIN — PANTOPRAZOLE SODIUM 40 MG: 40 TABLET, DELAYED RELEASE ORAL at 06:38

## 2021-01-01 RX ADMIN — PANTOPRAZOLE SODIUM 40 MG: 40 TABLET, DELAYED RELEASE ORAL at 06:17

## 2021-01-01 RX ADMIN — FUROSEMIDE 40 MG: 10 INJECTION INTRAMUSCULAR; INTRAVENOUS at 08:07

## 2021-01-01 RX ADMIN — LEVETIRACETAM 500 MG: 5 INJECTION, SOLUTION INTRAVENOUS at 08:16

## 2021-01-01 RX ADMIN — MAGNESIUM SULFATE 4 G: 4 INJECTION INTRAVENOUS at 09:37

## 2021-01-01 RX ADMIN — SODIUM CHLORIDE, PRESERVATIVE FREE 10 ML: 5 INJECTION INTRAVENOUS at 20:27

## 2021-01-01 RX ADMIN — THIAMINE HYDROCHLORIDE 100 ML/HR: 100 INJECTION, SOLUTION INTRAMUSCULAR; INTRAVENOUS at 12:54

## 2021-01-01 RX ADMIN — MULTIPLE VITAMINS W/ MINERALS TAB 1 TABLET: TAB at 11:46

## 2021-01-01 RX ADMIN — FUROSEMIDE 40 MG: 10 INJECTION INTRAMUSCULAR; INTRAVENOUS at 12:40

## 2021-01-01 RX ADMIN — PROPOFOL 50 MCG/KG/MIN: 10 INJECTION, EMULSION INTRAVENOUS at 18:42

## 2021-01-01 RX ADMIN — ARFORMOTEROL TARTRATE 15 MCG: 15 SOLUTION RESPIRATORY (INHALATION) at 19:36

## 2021-01-01 RX ADMIN — Medication 1 TABLET: at 09:13

## 2021-01-01 RX ADMIN — ENOXAPARIN SODIUM 40 MG: 40 INJECTION SUBCUTANEOUS at 09:01

## 2021-01-01 RX ADMIN — PHENYLEPHRINE HYDROCHLORIDE 2 SPRAY: 0.25 SPRAY NASAL at 08:09

## 2021-01-01 RX ADMIN — LORAZEPAM 4 MG: 2 INJECTION INTRAMUSCULAR; INTRAVENOUS at 06:05

## 2021-01-01 RX ADMIN — HALOPERIDOL LACTATE 2 MG: 5 INJECTION, SOLUTION INTRAMUSCULAR at 20:59

## 2021-01-01 RX ADMIN — SODIUM CHLORIDE 1 G: 9 INJECTION, SOLUTION INTRAMUSCULAR; INTRAVENOUS; SUBCUTANEOUS at 14:06

## 2021-01-01 RX ADMIN — ARFORMOTEROL TARTRATE 15 MCG: 15 SOLUTION RESPIRATORY (INHALATION) at 08:56

## 2021-01-01 RX ADMIN — ACETAMINOPHEN 650 MG: 325 TABLET ORAL at 08:20

## 2021-01-01 RX ADMIN — HYDROXYZINE HYDROCHLORIDE 25 MG: 25 TABLET, FILM COATED ORAL at 03:31

## 2021-01-01 RX ADMIN — PROPOFOL 50 MCG/KG/MIN: 10 INJECTION, EMULSION INTRAVENOUS at 02:35

## 2021-01-01 RX ADMIN — POTASSIUM PHOSPHATE, MONOBASIC AND POTASSIUM PHOSPHATE, DIBASIC 15 MMOL: 224; 236 INJECTION, SOLUTION, CONCENTRATE INTRAVENOUS at 03:25

## 2021-01-01 RX ADMIN — FUROSEMIDE 40 MG: 10 INJECTION INTRAMUSCULAR; INTRAVENOUS at 00:15

## 2021-01-01 RX ADMIN — LEVETIRACETAM 500 MG: 5 INJECTION, SOLUTION INTRAVENOUS at 22:49

## 2021-01-01 RX ADMIN — SODIUM CHLORIDE, PRESERVATIVE FREE 10 ML: 5 INJECTION INTRAVENOUS at 19:57

## 2021-01-01 RX ADMIN — LORAZEPAM 2 MG: 2 INJECTION INTRAMUSCULAR; INTRAVENOUS at 01:54

## 2021-01-01 RX ADMIN — BUMETANIDE 2 MG: 0.25 INJECTION, SOLUTION INTRAMUSCULAR; INTRAVENOUS at 08:43

## 2021-01-01 RX ADMIN — POTASSIUM & SODIUM PHOSPHATES POWDER PACK 280-160-250 MG 2 PACKET: 280-160-250 PACK at 08:08

## 2021-01-01 RX ADMIN — PHENYLEPHRINE HYDROCHLORIDE 2 SPRAY: 0.25 SPRAY NASAL at 13:54

## 2021-01-01 RX ADMIN — LEVOFLOXACIN 750 MG: 750 TABLET, FILM COATED ORAL at 16:13

## 2021-01-01 RX ADMIN — CALCIUM GLUCONATE 1 G: 20 INJECTION, SOLUTION INTRAVENOUS at 08:05

## 2021-01-01 RX ADMIN — FOLIC ACID 500 MCG: 1 TABLET ORAL at 08:07

## 2021-01-01 RX ADMIN — PROPOFOL 50 MCG/KG/MIN: 10 INJECTION, EMULSION INTRAVENOUS at 11:28

## 2021-01-01 RX ADMIN — MULTIPLE VITAMINS W/ MINERALS TAB 1 TABLET: TAB at 08:21

## 2021-01-01 RX ADMIN — ENOXAPARIN SODIUM 40 MG: 40 INJECTION SUBCUTANEOUS at 08:08

## 2021-01-01 RX ADMIN — ARFORMOTEROL TARTRATE 15 MCG: 15 SOLUTION RESPIRATORY (INHALATION) at 06:32

## 2021-01-01 RX ADMIN — PROPOFOL 50 MCG/KG/MIN: 10 INJECTION, EMULSION INTRAVENOUS at 05:10

## 2021-01-01 RX ADMIN — SODIUM CHLORIDE 100 ML/HR: 9 INJECTION, SOLUTION INTRAVENOUS at 00:35

## 2021-01-01 RX ADMIN — SODIUM BICARBONATE 150 MEQ: 84 INJECTION, SOLUTION INTRAVENOUS at 08:16

## 2021-01-01 RX ADMIN — EPINEPHRINE 1 MG: 0.1 INJECTION INTRACARDIAC; INTRAVENOUS at 21:19

## 2021-01-01 RX ADMIN — PROPOFOL 50 MCG/KG/MIN: 10 INJECTION, EMULSION INTRAVENOUS at 15:56

## 2021-01-01 RX ADMIN — NOREPINEPHRINE BITARTRATE 0.18 MCG/KG/MIN: 1 INJECTION, SOLUTION, CONCENTRATE INTRAVENOUS at 15:48

## 2021-01-01 RX ADMIN — ARFORMOTEROL TARTRATE 15 MCG: 15 SOLUTION RESPIRATORY (INHALATION) at 06:39

## 2021-01-01 RX ADMIN — MIDAZOLAM 8 MG/HR: 5 INJECTION, SOLUTION INTRAMUSCULAR; INTRAVENOUS at 14:16

## 2021-01-01 RX ADMIN — NOREPINEPHRINE BITARTRATE 0.16 MCG/KG/MIN: 1 INJECTION, SOLUTION, CONCENTRATE INTRAVENOUS at 19:17

## 2021-01-01 RX ADMIN — SALINE NASAL SPRAY 2 SPRAY: 1.5 SOLUTION NASAL at 09:37

## 2021-01-01 RX ADMIN — ACETAMINOPHEN 650 MG: 325 TABLET ORAL at 20:29

## 2021-01-01 RX ADMIN — NOREPINEPHRINE BITARTRATE 0.16 MCG/KG/MIN: 1 INJECTION, SOLUTION, CONCENTRATE INTRAVENOUS at 12:59

## 2021-01-01 RX ADMIN — POTASSIUM PHOSPHATE, MONOBASIC AND POTASSIUM PHOSPHATE, DIBASIC 15 MMOL: 224; 236 INJECTION, SOLUTION, CONCENTRATE INTRAVENOUS at 08:17

## 2021-01-01 RX ADMIN — EPINEPHRINE 1 MG: 0.1 INJECTION INTRACARDIAC; INTRAVENOUS at 21:10

## 2021-01-01 RX ADMIN — METOPROLOL SUCCINATE 25 MG: 25 TABLET, EXTENDED RELEASE ORAL at 09:02

## 2021-01-01 RX ADMIN — Medication 100 MG: at 09:02

## 2021-01-01 RX ADMIN — BUDESONIDE 0.5 MG: 0.5 SUSPENSION RESPIRATORY (INHALATION) at 08:56

## 2021-01-01 RX ADMIN — PROPOFOL 50 MCG/KG/MIN: 10 INJECTION, EMULSION INTRAVENOUS at 14:16

## 2021-01-01 RX ADMIN — ACETAZOLAMIDE SODIUM 250 MG: 500 INJECTION, POWDER, LYOPHILIZED, FOR SOLUTION INTRAVENOUS at 16:07

## 2021-01-01 RX ADMIN — CEFTRIAXONE SODIUM 2 G: 2 INJECTION, SOLUTION INTRAVENOUS at 15:03

## 2021-01-01 RX ADMIN — PROPOFOL 50 MCG/KG/MIN: 10 INJECTION, EMULSION INTRAVENOUS at 00:15

## 2021-01-01 RX ADMIN — ONDANSETRON 4 MG: 2 INJECTION INTRAMUSCULAR; INTRAVENOUS at 23:41

## 2021-01-01 RX ADMIN — PROPOFOL 50 MCG/KG/MIN: 10 INJECTION, EMULSION INTRAVENOUS at 22:55

## 2021-01-01 RX ADMIN — SODIUM CHLORIDE 1000 ML: 9 INJECTION, SOLUTION INTRAVENOUS at 23:42

## 2021-01-01 RX ADMIN — ROCURONIUM BROMIDE 100 MG: 10 INJECTION INTRAVENOUS at 21:19

## 2021-01-01 RX ADMIN — SALINE NASAL SPRAY 2 SPRAY: 1.5 SOLUTION NASAL at 18:06

## 2021-01-01 RX ADMIN — MULTIPLE VITAMINS W/ MINERALS TAB 1 TABLET: TAB at 18:07

## 2021-01-01 RX ADMIN — LORAZEPAM 2 MG: 2 INJECTION INTRAMUSCULAR; INTRAVENOUS at 10:24

## 2021-01-01 RX ADMIN — LORAZEPAM 4 MG: 2 INJECTION INTRAMUSCULAR; INTRAVENOUS at 19:01

## 2021-01-01 RX ADMIN — PROPOFOL 50 MCG/KG/MIN: 10 INJECTION, EMULSION INTRAVENOUS at 19:57

## 2021-01-01 RX ADMIN — ACETAMINOPHEN 650 MG: 325 TABLET ORAL at 04:03

## 2021-01-01 RX ADMIN — NOREPINEPHRINE BITARTRATE 0.14 MCG/KG/MIN: 1 INJECTION, SOLUTION, CONCENTRATE INTRAVENOUS at 08:18

## 2021-01-01 RX ADMIN — POTASSIUM & SODIUM PHOSPHATES POWDER PACK 280-160-250 MG 2 PACKET: 280-160-250 PACK at 17:28

## 2021-01-01 RX ADMIN — SODIUM BICARBONATE 150 MEQ: 84 INJECTION, SOLUTION INTRAVENOUS at 15:52

## 2021-01-01 RX ADMIN — LORAZEPAM 4 MG: 2 INJECTION INTRAMUSCULAR; INTRAVENOUS at 00:03

## 2021-01-01 RX ADMIN — LACTULOSE 60 G: 20 SOLUTION ORAL at 21:46

## 2021-01-01 RX ADMIN — ACETAMINOPHEN 650 MG: 325 TABLET ORAL at 01:27

## 2021-01-01 RX ADMIN — CEFTRIAXONE SODIUM 2 G: 2 INJECTION, SOLUTION INTRAVENOUS at 04:40

## 2021-01-01 RX ADMIN — PROPOFOL 50 MCG/KG/MIN: 10 INJECTION, EMULSION INTRAVENOUS at 21:24

## 2021-01-01 RX ADMIN — LEVETIRACETAM 500 MG: 5 INJECTION, SOLUTION INTRAVENOUS at 09:09

## 2021-01-01 RX ADMIN — IPRATROPIUM BROMIDE AND ALBUTEROL SULFATE 3 ML: .5; 2.5 SOLUTION RESPIRATORY (INHALATION) at 06:39

## 2021-01-01 RX ADMIN — LEVETIRACETAM 500 MG: 5 INJECTION, SOLUTION INTRAVENOUS at 11:46

## 2021-01-01 RX ADMIN — BUDESONIDE 0.5 MG: 0.5 SUSPENSION RESPIRATORY (INHALATION) at 06:39

## 2021-01-01 RX ADMIN — DIPHENHYDRAMINE HYDROCHLORIDE AND LIDOCAINE HYDROCHLORIDE AND ALUMINUM HYDROXIDE AND MAGNESIUM HYDRO 10 ML: KIT at 08:17

## 2021-01-01 RX ADMIN — PROPOFOL 50 MCG/KG/MIN: 10 INJECTION, EMULSION INTRAVENOUS at 09:14

## 2021-01-01 RX ADMIN — SODIUM CHLORIDE, PRESERVATIVE FREE 10 ML: 5 INJECTION INTRAVENOUS at 08:10

## 2021-01-01 RX ADMIN — SODIUM CHLORIDE, PRESERVATIVE FREE 10 ML: 5 INJECTION INTRAVENOUS at 08:18

## 2021-01-01 RX ADMIN — NOREPINEPHRINE BITARTRATE 0.16 MCG/KG/MIN: 1 INJECTION, SOLUTION, CONCENTRATE INTRAVENOUS at 05:36

## 2021-01-01 RX ADMIN — BUDESONIDE 0.5 MG: 0.5 SUSPENSION RESPIRATORY (INHALATION) at 18:58

## 2021-01-01 RX ADMIN — MIDAZOLAM 9 MG/HR: 5 INJECTION, SOLUTION INTRAMUSCULAR; INTRAVENOUS at 15:03

## 2021-01-01 RX ADMIN — PROPOFOL 50 MCG/KG/MIN: 10 INJECTION, EMULSION INTRAVENOUS at 01:47

## 2021-01-01 RX ADMIN — Medication 5 MG: at 21:15

## 2021-01-01 RX ADMIN — SODIUM CHLORIDE, PRESERVATIVE FREE 10 ML: 5 INJECTION INTRAVENOUS at 08:19

## 2021-01-01 RX ADMIN — ACETAMINOPHEN 650 MG: 325 TABLET ORAL at 19:14

## 2021-01-01 RX ADMIN — FUROSEMIDE 40 MG: 10 INJECTION INTRAMUSCULAR; INTRAVENOUS at 13:30

## 2021-01-01 RX ADMIN — PROPOFOL 50 MG: 10 INJECTION, EMULSION INTRAVENOUS at 14:28

## 2021-01-01 RX ADMIN — LEVETIRACETAM 500 MG: 5 INJECTION, SOLUTION INTRAVENOUS at 21:47

## 2021-01-01 RX ADMIN — PROPOFOL 50 MCG/KG/MIN: 10 INJECTION, EMULSION INTRAVENOUS at 21:41

## 2021-01-01 RX ADMIN — NADOLOL 20 MG: 20 TABLET ORAL at 08:08

## 2021-01-01 RX ADMIN — POTASSIUM & SODIUM PHOSPHATES POWDER PACK 280-160-250 MG 2 PACKET: 280-160-250 PACK at 18:05

## 2021-01-01 RX ADMIN — PHENYLEPHRINE HYDROCHLORIDE 2 SPRAY: 0.25 SPRAY NASAL at 20:29

## 2021-01-01 RX ADMIN — SALINE NASAL SPRAY 2 SPRAY: 1.5 SOLUTION NASAL at 21:15

## 2021-01-01 RX ADMIN — PROPOFOL 50 MG: 10 INJECTION, EMULSION INTRAVENOUS at 14:27

## 2021-01-01 RX ADMIN — BUMETANIDE 3 MG: 0.25 INJECTION, SOLUTION INTRAMUSCULAR; INTRAVENOUS at 22:48

## 2021-01-01 RX ADMIN — LACTULOSE 60 G: 20 SOLUTION ORAL at 17:14

## 2021-01-01 RX ADMIN — CEFTRIAXONE SODIUM 2 G: 2 INJECTION, SOLUTION INTRAVENOUS at 21:47

## 2021-01-01 RX ADMIN — EPINEPHRINE 1 MG: 0.1 INJECTION INTRACARDIAC; INTRAVENOUS at 21:16

## 2021-01-01 RX ADMIN — SALINE NASAL SPRAY 2 SPRAY: 1.5 SOLUTION NASAL at 08:09

## 2021-01-01 RX ADMIN — SODIUM CHLORIDE, PRESERVATIVE FREE 10 ML: 5 INJECTION INTRAVENOUS at 21:15

## 2021-01-01 RX ADMIN — MAGNESIUM SULFATE HEPTAHYDRATE 2 G: 500 INJECTION, SOLUTION INTRAMUSCULAR; INTRAVENOUS at 21:24

## 2021-01-01 RX ADMIN — SODIUM CHLORIDE 100 ML/HR: 9 INJECTION, SOLUTION INTRAVENOUS at 04:40

## 2021-01-01 RX ADMIN — BUMETANIDE 3 MG: 0.25 INJECTION, SOLUTION INTRAMUSCULAR; INTRAVENOUS at 16:07

## 2021-01-01 RX ADMIN — TRAZODONE HYDROCHLORIDE 100 MG: 100 TABLET ORAL at 21:56

## 2021-01-01 RX ADMIN — HEPARIN SODIUM 7500 UNITS: 5000 INJECTION, SOLUTION INTRAVENOUS; SUBCUTANEOUS at 05:36

## 2021-01-01 RX ADMIN — PROPOFOL 50 MCG/KG/MIN: 10 INJECTION, EMULSION INTRAVENOUS at 12:59

## 2021-01-01 RX ADMIN — PROPOFOL 50 MCG/KG/MIN: 10 INJECTION, EMULSION INTRAVENOUS at 16:57

## 2021-01-01 RX ADMIN — SALINE NASAL SPRAY 2 SPRAY: 1.5 SOLUTION NASAL at 12:23

## 2021-01-01 RX ADMIN — FOLIC ACID 500 MCG: 1 TABLET ORAL at 18:07

## 2021-01-01 RX ADMIN — PROPOFOL 50 MCG/KG/MIN: 10 INJECTION, EMULSION INTRAVENOUS at 07:39

## 2021-01-01 RX ADMIN — ENOXAPARIN SODIUM 40 MG: 40 INJECTION SUBCUTANEOUS at 08:27

## 2021-01-01 RX ADMIN — DIPHENHYDRAMINE HYDROCHLORIDE AND LIDOCAINE HYDROCHLORIDE AND ALUMINUM HYDROXIDE AND MAGNESIUM HYDRO 10 ML: KIT at 17:38

## 2021-01-01 RX ADMIN — FOLIC ACID 500 MCG: 1 TABLET ORAL at 08:21

## 2021-01-01 RX ADMIN — LEVOFLOXACIN 750 MG: 750 TABLET, FILM COATED ORAL at 14:20

## 2021-01-01 RX ADMIN — POTASSIUM CHLORIDE 40 MEQ: 750 CAPSULE, EXTENDED RELEASE ORAL at 09:02

## 2021-01-01 RX ADMIN — SODIUM BICARBONATE 50 MEQ: 84 INJECTION, SOLUTION INTRAVENOUS at 21:20

## 2021-01-01 RX ADMIN — LORAZEPAM 2 MG: 2 INJECTION INTRAMUSCULAR; INTRAVENOUS at 20:09

## 2021-01-01 RX ADMIN — HEPARIN SODIUM 7500 UNITS: 5000 INJECTION, SOLUTION INTRAVENOUS; SUBCUTANEOUS at 22:49

## 2021-01-01 RX ADMIN — PANTOPRAZOLE SODIUM 40 MG: 40 TABLET, DELAYED RELEASE ORAL at 06:33

## 2021-01-01 RX ADMIN — PANTOPRAZOLE SODIUM 40 MG: 40 INJECTION, POWDER, FOR SOLUTION INTRAVENOUS at 05:36

## 2021-01-01 RX ADMIN — BUDESONIDE 0.5 MG: 0.5 SUSPENSION RESPIRATORY (INHALATION) at 06:32

## 2021-01-01 RX ADMIN — THIAMINE HYDROCHLORIDE 100 ML/HR: 100 INJECTION, SOLUTION INTRAMUSCULAR; INTRAVENOUS at 09:06

## 2021-01-01 RX ADMIN — HEPARIN SODIUM 7500 UNITS: 5000 INJECTION, SOLUTION INTRAVENOUS; SUBCUTANEOUS at 15:04

## 2021-01-01 RX ADMIN — POTASSIUM & SODIUM PHOSPHATES POWDER PACK 280-160-250 MG 2 PACKET: 280-160-250 PACK at 09:02

## 2021-01-01 RX ADMIN — PANTOPRAZOLE SODIUM 40 MG: 40 INJECTION, POWDER, FOR SOLUTION INTRAVENOUS at 05:44

## 2021-01-01 RX ADMIN — NOREPINEPHRINE BITARTRATE 0.17 MCG/KG/MIN: 1 INJECTION, SOLUTION, CONCENTRATE INTRAVENOUS at 00:16

## 2021-01-01 RX ADMIN — ACETAMINOPHEN 650 MG: 325 TABLET ORAL at 12:23

## 2021-01-01 RX ADMIN — HEPARIN SODIUM 5000 UNITS: 5000 INJECTION, SOLUTION INTRAVENOUS; SUBCUTANEOUS at 08:16

## 2021-01-01 RX ADMIN — AZITHROMYCIN 500 MG: 500 INJECTION, POWDER, LYOPHILIZED, FOR SOLUTION INTRAVENOUS at 16:24

## 2021-01-01 RX ADMIN — ONDANSETRON 4 MG: 2 INJECTION INTRAMUSCULAR; INTRAVENOUS at 13:03

## 2021-01-01 RX ADMIN — VANCOMYCIN HYDROCHLORIDE 2250 MG: 5 INJECTION, POWDER, LYOPHILIZED, FOR SOLUTION INTRAVENOUS at 05:31

## 2021-01-01 RX ADMIN — VANCOMYCIN HYDROCHLORIDE 2500 MG: 5 INJECTION, POWDER, LYOPHILIZED, FOR SOLUTION INTRAVENOUS at 19:02

## 2021-01-01 RX ADMIN — SODIUM CHLORIDE, PRESERVATIVE FREE 10 ML: 5 INJECTION INTRAVENOUS at 22:53

## 2021-01-01 RX ADMIN — PROPOFOL 50 MCG/KG/MIN: 10 INJECTION, EMULSION INTRAVENOUS at 08:04

## 2021-01-01 RX ADMIN — ARFORMOTEROL TARTRATE 15 MCG: 15 SOLUTION RESPIRATORY (INHALATION) at 18:58

## 2021-01-01 RX ADMIN — PROPOFOL 50 MCG/KG/MIN: 10 INJECTION, EMULSION INTRAVENOUS at 10:15

## 2021-05-10 NOTE — H&P
History and Physical      Patient Name: Andrey Knight   Patient ID: [id number]   Sex: Male   YOB: 1980    Primary Care Provider: Ernesto Chapman MD    Visit Date: April 1, 2021    Provider: Fish Duvall MD   Location: [facility]   Location Address: [address] [place] [address]  [id number]   Location Phone: [phone]          Chief Complaint  · Left shoulder pain       History Of Present Illness  Andrey Knight is a 41 year old /White male who presents today to [facility].      The patient presents here today for follow up evaluation of left shoulder. He is S/P Left shoulder open distal clavicle resection, irrigation and debridement of left acromioclavicular joint.  He is on IV antibiotics. He has some drainage. septic acromioclavicular joint was admitted to hospital and had open distal clavicle resection and debridement. He is on IV antibiotics. His cultures grew MSSA.  He denies fever and chills.          Past Medical History  * No Pertinent Past Medical History         Medication List  hydrocodone-acetaminophen 5-500 mg Oral Tablet         Allergy List  NO KNOWN DRUG ALLERGIES       Allergies Reconciled  Family Medical History  Abdominal pain, generalized; - No Family History of Colorectal Cancer         Social History  Alcohol (Former); Tobacco (Current every day)         Review of Systems  · Constitutional  o Denies  o : fever, chills, weight loss  · Cardiovascular  o Denies  o : chest pain, shortness of breath  · Gastrointestinal  o Denies  o : liver disease, heartburn, nausea, blood in stools  · Genitourinary  o Denies  o : painful urination, blood in urine  · Integument  o Denies  o : rash, itching  · Neurologic  o Denies  o : headache, weakness, loss of consciousness  · Musculoskeletal  o Denies  o : painful, swollen joints  · Psychiatric  o Denies  o : drug/alcohol addiction, anxiety, depression      Vitals  Date Time BP Position Site L\R Cuff Size HR  "RR TEMP (F) WT  HT  BMI kg/m2 BSA m2 O2 Sat FR L/min FiO2 HC       04/01/2021 11:06 AM      98 - R   214lbs 6oz 5'  9\" 31.66 2.18 98 %            Physical Examination  · Constitutional  o Appearance  o : well developed, well-nourished, no obvious deformities present  · Head and Face  o Head  o :   § Inspection  § : normocephalic  o Face  o :   § Inspection  § : no facial lesions  · Eyes  o Conjunctivae  o : conjunctivae normal  o Sclerae  o : sclerae white  · Ears, Nose, Mouth and Throat  o Ears  o :   § External Ears  § : appearance within normal limits  § Hearing  § : intact  o Nose  o :   § External Nose  § : appearance normal  · Neck  o Inspection/Palpation  o : normal appearance  o Range of Motion  o : full range of motion  · Respiratory  o Respiratory Effort  o : breathing unlabored  o Inspection of Chest  o : normal appearance  o Auscultation of Lungs  o : no audible wheezing or rales  · Cardiovascular  o Heart  o : regular rate  · Gastrointestinal  o Abdominal Examination  o : soft and non-tender  · Skin and Subcutaneous Tissue  o General Inspection  o : intact, no rashes  · Psychiatric  o General  o : Alert and oriented x3  o Judgement and Insight  o : judgment and insight intact  o Mood and Affect  o : mood normal, affect appropriate  · Left Shoulder  o Inspection  o : Redness and small pro drainage over incision at anterior shoulder. Neurovascularly intact. Mildly limited ROM to FE.           Assessment  · Aftercare following Left shoulder open distal clavicle resection, irrigation and debridement of left acromioclavicular joint, Left Shoulder Septic arthritis acromioclavicular joint      V54.81  · Left shoulder pain, unspecified chronicity     719.41/M25.512      Plan  · Orders  o Tobacco cessation counseling completed (5114F) - - 04/01/2021  · Medications  o Medications have been Reconciled  o Transition of Care or Provider Policy  · Instructions  o Reviewed the patient's Past Medical, Social, and " Family history as well as the ROS at today's visit, no changes.  o Call or return if worsening symptoms.  o Discussed surgery.  o Risks/benefits discussed with patient including, but not limited to: infection, bleeding, neurovascular damage, malunion, nonunion, aesthetic deformity, need for further surgery, and death.  o Surgery pamphlet given.  o The above service was scribed by Leslie Cam on my behalf and I attest to the accuracy of the note. jsb  o Discussed the risks and benefits of operative treatment. Plan for surgery Monday. Plan to continue IV antibiotic and dressing changes. I am concerned the patient may need repeat irrigation and the patient would like to wait a few days. Plan to schedule Left shoulder Irrigation and Debridement Monday.   o Electronically Identified Patient Education Materials Provided Electronically            Electronically Signed by: Leslie Cam MA -Author on April 1, 2021 12:49:46 PM  Electronically Co-signed by: Fish Duvall MD -Reviewer on April 1, 2021 09:32:23 PM

## 2021-05-14 NOTE — PROGRESS NOTES
Progress Note      Patient Name: Andrey Knight   Patient ID: [id number]   Sex: Male   YOB: 1980    Primary Care Provider: Ernesto Chapman MD    Visit Date: April 20, 2021    Provider: Fish Duvall MD   Location: [facility]   Location Address: [address] [place] [address]  [id number]   Location Phone: [phone]          Chief Complaint  · Left shoulder pain      History Of Present Illness  Andrey Knight is a 41 year old /White male who presents today to [facility].      The patient presents here today for follow up evaluation of left shoulder. He is S/P Left shoulder open distal clavicle resection, irrigation and debridement of left acromioclavicular joint. He is completed iv antibiotics. He is overall doing well.       Past Medical History  * No Pertinent Past Medical History; Hypertension         Past Surgical History  I have had no surgeries         Medication List  Augmentin 500-125 mg oral tablet; hydrocodone-acetaminophen 5-500 mg Oral Tablet         Allergy List  NO KNOWN DRUG ALLERGIES       Allergies Reconciled  Family Medical History  Abdominal pain, generalized; Cancer, Unspecified; Diabetes, unspecified type; - No Family History of Colorectal Cancer         Social History  Alcohol (Former); Alcohol Use (Current some day); Claustophobic (Unknown); lives alone; Recreational Drug Use (Never); Single.; Tobacco (Never); Unemployed.         Review of Systems  · Constitutional  o Denies  o : fever, chills, weight loss  · Cardiovascular  o Denies  o : chest pain, shortness of breath  · Gastrointestinal  o Denies  o : liver disease, heartburn, nausea, blood in stools  · Genitourinary  o Denies  o : painful urination, blood in urine  · Integument  o Denies  o : rash, itching  · Neurologic  o Denies  o : headache, weakness, loss of consciousness  · Musculoskeletal  o Denies  o : painful, swollen joints  · Psychiatric  o Denies  o : drug/alcohol addiction,  "anxiety, depression      Vitals  Date Time BP Position Site L\R Cuff Size HR RR TEMP (F) WT  HT  BMI kg/m2 BSA m2 O2 Sat FR L/min FiO2 HC       04/20/2021 04:11 PM         213lbs 16oz 5'  9\" 31.6 2.17             Physical Examination  · Constitutional  o Appearance  o : well developed, well-nourished, no obvious deformities present  · Head and Face  o Head  o :   § Inspection  § : normocephalic  o Face  o :   § Inspection  § : no facial lesions  · Eyes  o Conjunctivae  o : conjunctivae normal  o Sclerae  o : sclerae white  · Ears, Nose, Mouth and Throat  o Ears  o :   § External Ears  § : appearance within normal limits  § Hearing  § : intact  o Nose  o :   § External Nose  § : appearance normal  · Neck  o Inspection/Palpation  o : normal appearance  o Range of Motion  o : full range of motion  · Respiratory  o Respiratory Effort  o : breathing unlabored  o Inspection of Chest  o : normal appearance  o Auscultation of Lungs  o : no audible wheezing or rales  · Cardiovascular  o Heart  o : regular rate  · Gastrointestinal  o Abdominal Examination  o : soft and non-tender  · Skin and Subcutaneous Tissue  o General Inspection  o : intact, no rashes  · Psychiatric  o General  o : Alert and oriented x3  o Judgement and Insight  o : judgment and insight intact  o Mood and Affect  o : mood normal, affect appropriate  · Left Shoulder  o Inspection  o : Incision well healing. No signs of infection. Full ROM. Mild redness. No significant tenderness. Sensation to light touch median, radial, ulnar nerve. Positive AIN, PIN, ulnar nerve. Positive pulses.           Assessment  · Aftercare following open distal clavicle resection, irrigation and debridement of left acromioclavicular joint     V54.81  · Left shoulder pain, unspecified chronicity     719.41/M25.512  · Septic acromioclavicular joint     711.00/M00.9      Plan  · Medications  o Medications have been Reconciled  o Transition of Care or Provider " Policy  · Instructions  o Reviewed the patient's Past Medical, Social, and Family history as well as the ROS at today's visit, no changes.  o Call or return if worsening symptoms.  o The above service was scribed by Leslie Cam on my behalf and I attest to the accuracy of the note. jsb  o Discussed the treatment plan with the patient. Prescription for Augmentin 500mg BID x14 days given today. He able to return to activity as tolerated. Follow up in 4 weeks to recheck.  o Electronically Identified Patient Education Materials Provided Electronically            Electronically Signed by: Leslie Cam MA -Author on April 21, 2021 08:15:54 AM  Electronically Co-signed by: Fish Duvall MD -Reviewer on April 21, 2021 10:12:49 PM

## 2021-06-05 NOTE — PROGRESS NOTES
Progress Note      Patient Name: Andrey Knight   Patient ID: [id number]   Sex: Male   YOB: 1980    Primary Care Provider: Ernesto Chapman MD    Visit Date: May 25, 2021    Provider: Fish Duvall MD   Location: [facility]   Location Address: [address] [place] [address]  [id number]   Location Phone: [phone]          Chief Complaint  · Left shoulder pain      History Of Present Illness  Andrey Knight is a 41 year old /White male who presents today to [facility].      The patient presents here today for follow up evaluation of left shoulder. He is S/P Left shoulder open distal clavicle resection, irrigation and debridement of left acromioclavicular joint.  He is overall doing very well. He has completed all of his antibiotics. He has no new complaints today.          Past Medical History  * No Pertinent Past Medical History; Hypertension         Past Surgical History  I have had no surgeries         Medication List  Augmentin 500-125 mg oral tablet; hydrocodone-acetaminophen 5-500 mg Oral Tablet; losartan 25 mg oral tablet; metoprolol succinate 25 mg oral tablet extended release 24 hr         Allergy List  NO KNOWN DRUG ALLERGIES       Allergies Reconciled  Family Medical History  Abdominal pain, generalized; Cancer, Unspecified; Diabetes, unspecified type; - No Family History of Colorectal Cancer         Social History  Alcohol (Former); Alcohol Use (Current some day); Claustophobic (Unknown); lives alone; Recreational Drug Use (Never); Single.; Tobacco (Never); Unemployed.         Review of Systems  · Constitutional  o Denies  o : fever, chills, weight loss  · Cardiovascular  o Denies  o : chest pain, shortness of breath  · Gastrointestinal  o Denies  o : liver disease, heartburn, nausea, blood in stools  · Genitourinary  o Denies  o : painful urination, blood in urine  · Integument  o Denies  o : rash, itching  · Neurologic  o Denies  o : headache, weakness,  "loss of consciousness  · Musculoskeletal  o Denies  o : painful, swollen joints  · Psychiatric  o Denies  o : drug/alcohol addiction, anxiety, depression      Vitals  Date Time BP Position Site L\R Cuff Size HR RR TEMP (F) WT  HT  BMI kg/m2 BSA m2 O2 Sat FR L/min FiO2        05/25/2021 04:00 PM         213lbs 16oz 5'  9\" 31.6 2.17             Physical Examination  · Constitutional  o Appearance  o : well developed, well-nourished, no obvious deformities present  · Head and Face  o Head  o :   § Inspection  § : normocephalic  o Face  o :   § Inspection  § : no facial lesions  · Eyes  o Conjunctivae  o : conjunctivae normal  o Sclerae  o : sclerae white  · Ears, Nose, Mouth and Throat  o Ears  o :   § External Ears  § : appearance within normal limits  § Hearing  § : intact  o Nose  o :   § External Nose  § : appearance normal  · Neck  o Inspection/Palpation  o : normal appearance  o Range of Motion  o : full range of motion  · Respiratory  o Respiratory Effort  o : breathing unlabored  o Inspection of Chest  o : normal appearance  o Auscultation of Lungs  o : no audible wheezing or rales  · Cardiovascular  o Heart  o : regular rate  · Gastrointestinal  o Abdominal Examination  o : soft and non-tender  · Skin and Subcutaneous Tissue  o General Inspection  o : intact, no rashes  · Psychiatric  o General  o : Alert and oriented x3  o Judgement and Insight  o : judgment and insight intact  o Mood and Affect  o : mood normal, affect appropriate  · Left Shoulder  o Inspection  o : Incision well healed. No signs of infection. Full shoulder ROM. Sensation to light touch median, radial, ulnar nerve. Positive AIN, PIN, ulnar nerve. Positive pulses. Neurovascularly intact.           Assessment  · Aftercare following Left shoulder open distal clavicle resection, irrigation and debridement of left acromioclavicular joint     V54.81  · Left shoulder pain, unspecified " chronicity     719.41/M25.512      Plan  · Medications  o Medications have been Reconciled  o Transition of Care or Provider Policy  · Instructions  o Reviewed the patient's Past Medical, Social, and Family history as well as the ROS at today's visit, no changes.  o Call or return if worsening symptoms.  o The above service was scribed by Leslie Cam on my behalf and I attest to the accuracy of the note. jsb  o Discussed the treatment plan with the patient. Activity as tolerated. Plan to follow up as needed.  o Electronically Identified Patient Education Materials Provided Electronically            Electronically Signed by: Leslie Cam MA -Author on May 26, 2021 08:54:03 AM  Electronically Co-signed by: Fish Duvall MD -Reviewer on May 26, 2021 09:42:12 PM

## 2021-07-24 NOTE — PROGRESS NOTES
Chief Complaint  Hypertension (new patient) and Shortness of Breath    Subjective            Andrey Knight presents to [facility]  History of Present Illness  Mr. Knight is a new patient who was referred here due to a mildly abnormal echocardiogram back in March 2021 that showed mildly reduced left ventricular systolic function with an estimated ejection fraction of 45-50% and mild global hypokinesis.  He was admitted to [facility] at that time with left shoulder pain and was ultimately found to have a septic joint with MSSA.  He ultimately required surgery with open distal clavicle resection and debridement of his left AC joint, followed by prolonged antibiotic therapy.  Mr. Knight now states he is back at his normal physical activity baseline and has fully recovered.  His most recent ECG on 3/13/21 showed sinus tachycardia with borderline right axis deviation and a borderline nonspecific T wave abnormality in the inferior leads.  He does not report any episodes of chest pain/pressure or any palpitations, orthopnea, PND, peripheral edema, lightheadedness, or syncope.  Mr. Escudero does report mild chronic exertional dyspnea, which has been stable since his hospital discharge several months ago.  He has a history of hypertension and his BP was elevated at 149/94 in the office today (despite reported excellent compliance with his home dose of losartan).       Past Medical History:   Diagnosis Date   • Hypertension        Past Surgical History:   • SHOULDER DEBRIDEMENT       Social History     Tobacco Use   • Smoking status: Never Smoker   • Smokeless tobacco: Never Used   Vaping Use   • Vaping Use: Never used   Substance Use Topics   • Alcohol use: Yes     Alcohol/week: 2.0 standard drinks     Types: 2 Standard drinks or equivalent per week     Comment: Socially   • Drug use: Never       Family History   Problem Relation Age of Onset   • Cancer Mother         Unspecified   • Diabetes Father  "        Unspecified type   • Other Other         Abdominal pain, generalized        Current Outpatient Medications on File Prior to Visit   Medication Sig   • melatonin 1 MG tablet Take  by mouth.   • omeprazole (priLOSEC) 20 MG capsule Take 20 mg by mouth Daily.   • Probiotic Product (PROBIOTIC-10 PO) Take  by mouth.   • traZODone (DESYREL) 50 MG tablet Take 50 mg by mouth Every Night.   • losartan (COZAAR) 25 MG tablet      No current facility-administered medications on file prior to visit.       Allergies   Allergen Reactions   • Flexeril [Cyclobenzaprine] Itching       Review of Systems   Constitutional: Negative for chills, fever, unexpected weight gain and unexpected weight loss.   HENT: Negative for hearing loss, nosebleeds and sinus pressure.    Eyes: Negative for pain and visual disturbance.   Respiratory: Positive for shortness of breath. Negative for cough and wheezing.    Cardiovascular: Negative for chest pain, palpitations and leg swelling.   Gastrointestinal: Negative for abdominal pain, nausea and vomiting.   Endocrine: Negative for cold intolerance, heat intolerance, polydipsia and polyuria.   Genitourinary: Negative for dysuria and hematuria.   Musculoskeletal: Negative for arthralgias and back pain.   Skin: Negative for pallor and rash.   Neurological: Positive for headache. Negative for dizziness, syncope, speech difficulty and weakness.   Hematological: Negative for adenopathy. Does not bruise/bleed easily.        Objective     /94 (BP Location: Left arm, Patient Position: Sitting, Cuff Size: Adult)   Pulse 119   Ht 170.2 cm (67\")   Wt 102 kg (224 lb)   BMI 35.08 kg/m²       Physical Exam  Constitutional:       General: He is not in acute distress.     Appearance: Normal appearance.   HENT:      Head: Atraumatic.      Mouth/Throat:      Mouth: Mucous membranes are moist.      Pharynx: Oropharynx is clear. No oropharyngeal exudate.   Eyes:      General: No scleral icterus.     " Conjunctiva/sclera: Conjunctivae normal.   Neck:      Vascular: No carotid bruit or JVD.   Cardiovascular:      Rate and Rhythm: Regular rhythm. Tachycardia present.      Chest Wall: PMI is not displaced.      Pulses: Normal pulses.           Radial pulses are 2+ on the right side and 2+ on the left side.      Heart sounds: S1 normal and S2 normal. No murmur heard.   No friction rub. No gallop. No S3 or S4 sounds.    Pulmonary:      Effort: Pulmonary effort is normal. No accessory muscle usage or respiratory distress.      Breath sounds: No decreased breath sounds, wheezing, rhonchi or rales.   Chest:      Chest wall: No tenderness.   Abdominal:      General: Bowel sounds are normal. There is no distension.      Palpations: Abdomen is soft. There is no mass.      Tenderness: There is no abdominal tenderness.   Musculoskeletal:         General: No swelling, tenderness or deformity.      Cervical back: Neck supple. No tenderness.      Right lower leg: No edema.      Left lower leg: No edema.   Skin:     General: Skin is warm and dry.      Coloration: Skin is not jaundiced.      Findings: No erythema or rash.      Nails: There is no clubbing.   Neurological:      General: No focal deficit present.      Mental Status: He is alert and oriented to person, place, and time.      Motor: No weakness.   Psychiatric:         Mood and Affect: Mood normal.         Behavior: Behavior normal.         Result Review :     The following data was reviewed by: Feliberto Trotter MD on 07/16/2021:    CMP    CMP 4/5/21 4/6/21 4/7/21   Glucose 183 (A) 145 (A) 104 (A)   BUN 21 25 22   Creatinine 1.50 (A) 1.33 (A) 1.13   Sodium 137 136 135   Potassium 4.9 4.9 3.8   Chloride 99 102 102   Calcium 9.1 8.8 8.2 (A)   (A) Abnormal value            CBC    CBC 4/5/21 4/6/21 4/7/21   WBC 9.03 10.48 8.01   RBC 4.03 (A) 3.68 (A) 3.46 (A)   Hemoglobin 12.8 (A) 11.5 (A) 11.0 (A)   Hematocrit 39.1 (A) 35.6 (A) 33.5 (A)   MCV 97.0 (A) 96.7 (A) 96.8 (A)    MCH 31.8 (A) 31.3 (A) 31.8 (A)   MCHC 32.7 (A) 32.3 (A) 32.8 (A)   RDW 14.5 (A) 14.2 14.1   Platelets 259 216 186   (A) Abnormal value            Echocardiogram 3/14/21:  CONCLUSIONS:    1.  Technically difficult study with limited visualization in multiple views.        Lumason echocardiographic contrast was administered to improve        endocardial definition. Mildly reduced left ventricular systolic function        with an estimated ejection fraction of 45-50% and mild global hypokinesis.    2.  Normal diastolic filling pattern.    3.  Mild mitral regurgitation, but no hemodynamically significant valvular        disease.        Assessment and Plan      Diagnoses and all orders for this visit:    1. Cardiomyopathy, unspecified type (CMS/HCC) (Primary)  -     carvedilol (COREG) 12.5 MG tablet; Take 1 tablet by mouth 2 (Two) Times a Day.  Dispense: 60 tablet; Refill: 11    2. Essential hypertension  -     carvedilol (COREG) 12.5 MG tablet; Take 1 tablet by mouth 2 (Two) Times a Day.  Dispense: 60 tablet; Refill: 11    3. Obesity (BMI 35.0-39.9 without comorbidity)    -Cardiomyopathy:  Very mild per echo earlier this year with EF=45-50% and mild global hypokinesis.  He has no signs/symptoms of decompensated CHF at this time.  Will start carvedilol 12.5 mg twice daily and assess his response.    -Hypertension:  BP remains uncontrolled despite therapy with low-dose losartan.  Will start carvedilol as above.  He was instructed to keep a blood pressure log and bring it to his next visit.    -Obesity:  BMI=35.1 today.  He plans to start going to a local gym and increase his exercise regimen significantly in the next few weeks.  We reviewed some dietary and exercise strategies to assist with significant weight loss.  I recommended aerobic exercise for at least 30 minutes 4 or more times per week.      Follow Up     Return in about 6 months (around 1/16/2022) for Next scheduled follow up.    Patient was given  instructions and counseling regarding his condition or for health maintenance advice. Please see specific information pulled into the AVS if appropriate.     Andrey Knight  reports that he has never smoked. He has never used smokeless tobacco.. I have educated him on the risk of diseases from using tobacco products such as cancer, COPD and heart disease.

## 2021-08-20 NOTE — ED PROVIDER NOTES
"Subjective   41-year-old male presents to the emergency department with complaints of right flank pain x2 days with hematuria.  He states his urine has seemed darker than normal.  Flank pain described as sharp, intermittent, colicky in nature \"like a tube being pinched.\" Patient also complaining of diarrhea greater than 1 month in duration with episodes reported \"40-50 times per day, yellow in color, and loose.\"  He states he has a historical diagnosis of irritable bowel syndrome. He denies any nausea, vomiting, fevers or chills.  He did denies any recent antibiotic usage, history of kidney stones, or other chronic comorbidities.       History provided by:  Patient   used: No    Vomiting  The primary symptoms include abdominal pain, vomiting, diarrhea and dysuria. Primary symptoms do not include fever, weight loss, fatigue, nausea, melena, hematemesis, jaundice, hematochezia, myalgias, arthralgias or rash. The illness began 2 days ago. The onset was gradual. The problem has been gradually worsening.   The diarrhea began more than 1 week ago. The diarrhea is watery. The diarrhea occurs continuously.   The dysuria is not associated with hematuria.     The illness does not include chills.   Abdominal Pain  Associated symptoms: diarrhea, dysuria and vomiting    Associated symptoms: no chest pain, no chills, no cough, no fatigue, no fever, no hematemesis, no hematochezia, no hematuria, no melena, no nausea, no shortness of breath and no sore throat    Diarrhea  The primary symptoms include abdominal pain, vomiting, diarrhea and dysuria. Primary symptoms do not include fever, weight loss, fatigue, nausea, melena, hematemesis, jaundice, hematochezia, myalgias, arthralgias or rash.   The dysuria is not associated with hematuria.     The illness does not include chills.       Review of Systems   Constitutional: Negative for chills, fatigue, fever and weight loss.   HENT: Negative for congestion, ear pain " and sore throat.    Eyes: Negative for pain.   Respiratory: Negative for cough, chest tightness and shortness of breath.    Cardiovascular: Negative for chest pain.   Gastrointestinal: Positive for abdominal pain, diarrhea and vomiting. Negative for abdominal distention, blood in stool, hematemesis, hematochezia, jaundice, melena and nausea.   Genitourinary: Positive for dysuria and flank pain. Negative for hematuria.   Musculoskeletal: Negative for arthralgias, joint swelling and myalgias.   Skin: Negative for pallor and rash.   Neurological: Negative for seizures and headaches.   All other systems reviewed and are negative.      Past Medical History:   Diagnosis Date   • Hypertension        Allergies   Allergen Reactions   • Flexeril [Cyclobenzaprine] Itching       Past Surgical History:   Procedure Laterality Date   • SHOULDER DEBRIDEMENT Left        Family History   Problem Relation Age of Onset   • Cancer Mother         Unspecified   • Diabetes Father         Unspecified type   • Other Other         Abdominal pain, generalized       Social History     Socioeconomic History   • Marital status: Single     Spouse name: Not on file   • Number of children: Not on file   • Years of education: Not on file   • Highest education level: Not on file   Tobacco Use   • Smoking status: Never Smoker   • Smokeless tobacco: Never Used   Vaping Use   • Vaping Use: Never used   Substance and Sexual Activity   • Alcohol use: Yes     Alcohol/week: 2.0 standard drinks     Types: 2 Standard drinks or equivalent per week     Comment: Socially   • Drug use: Never   • Sexual activity: Defer           Objective   Physical Exam  Vitals and nursing note reviewed.   Constitutional:       General: He is not in acute distress.     Appearance: Normal appearance. He is not toxic-appearing.   HENT:      Head: Normocephalic and atraumatic.      Mouth/Throat:      Mouth: Mucous membranes are moist.   Eyes:      Extraocular Movements: Extraocular  movements intact.      Pupils: Pupils are equal, round, and reactive to light.   Cardiovascular:      Rate and Rhythm: Normal rate and regular rhythm.      Pulses: Normal pulses.      Heart sounds: Normal heart sounds.   Pulmonary:      Effort: Pulmonary effort is normal. No respiratory distress.      Breath sounds: Normal breath sounds.   Abdominal:      General: Abdomen is flat.      Palpations: Abdomen is soft.      Tenderness: There is no abdominal tenderness. There is right CVA tenderness.   Musculoskeletal:         General: Normal range of motion.      Cervical back: Normal range of motion and neck supple.   Skin:     General: Skin is warm and dry.   Neurological:      Mental Status: He is alert and oriented to person, place, and time. Mental status is at baseline.         Procedures           ED Course                                           MDM  Number of Diagnoses or Management Options  Abdominal pain, unspecified abdominal location  Diarrhea, unspecified type  Right flank pain  Urinary tract infection in male  Diagnosis management comments: I have spoke with the patient and I have explained the patient´s condition, diagnoses and treatment plan based on the information available to me at this time. I have answered all questions and addressed any concerns. The patient has a good understanding of the patient´s diagnosis, condition, and treatment plan as can be expected at this point. The vital signs have been stable. The patient´s condition is stable and appropriate for discharge from the emergency department.      The patient will pursue further outpatient evaluation with the primary care physician or other designated or consulting physician as outlined in the discharge instructions. They are agreeable to this plan of care and follow-up instructions have been explained in detail. The patient has received these instructions in written format and have expressed an understanding of the discharge instructions.  The patient is aware that any significant change in condition or worsening of symptoms should prompt an immediate return to this or the closest emergency department or call to 911.       Amount and/or Complexity of Data Reviewed  Clinical lab tests: reviewed and ordered  Tests in the radiology section of CPT®: reviewed and ordered  Decide to obtain previous medical records or to obtain history from someone other than the patient: yes    Risk of Complications, Morbidity, and/or Mortality  Presenting problems: low  Diagnostic procedures: low  Management options: low  General comments: Patient labs and imaging were discussed with patient at bedside, at which time patient opted for outpatient labs for stool culture.  Patient was seen in the emergency department and unable to give stool sample while here.  Patient was treated for urinary tract infection and diarrhea, unspecified type.    He was encouraged to follow-up with primary care provider for continued control of irritable bowel.  He was also instructed to follow-up with GI due to hepatomegaly, portal hypertension found on CT scan. He was agreeable and understanding of this treatment plan.    Patient Progress  Patient progress: stable      Final diagnoses:   Abdominal pain, unspecified abdominal location   Right flank pain   Urinary tract infection in male   Diarrhea, unspecified type       ED Disposition  ED Disposition     ED Disposition Condition Comment    Discharge Stable           Darrian Russell MD  [address] [place] [address]  [phone]    Schedule an appointment as soon as possible for a visit   FU for elevated liver enzymes and portal hypertension         Medication List      New Prescriptions    cephalexin 500 MG capsule  Commonly known as: KEFLEX  Take 1 capsule by mouth 4 (Four) Times a Day for 10 days.     dicyclomine 20 MG tablet  Commonly known as: BENTYL  Take 1 tablet by mouth Every 6 (Six) Hours.     ketorolac 10 MG  tablet  Commonly known as: TORADOL  Take 1 tablet by mouth Every 6 (Six) Hours As Needed for Moderate Pain .     loperamide 2 MG capsule  Commonly known as: IMODIUM  Take 1 capsule by mouth 4 (Four) Times a Day As Needed for Diarrhea for up to 3 days.     ondansetron ODT 4 MG disintegrating tablet  Commonly known as: ZOFRAN-ODT  Place 1 tablet under the tongue Every 8 (Eight) Hours As Needed for Nausea or Vomiting.           Where to Get Your Medications      These medications were sent to [id number] - [place], [place] - [address]  AT [facility] [address] - [phone]  - [phone]   [address] [facility] [address]    Phone: [phone]   · cephalexin 500 MG capsule  · dicyclomine 20 MG tablet  · ketorolac 10 MG tablet  · loperamide 2 MG capsule  · ondansetron ODT 4 MG disintegrating tablet          Amanda Ariza PA-C  08/20/21 1424       Amanda Ariza PA-C  08/20/21 1439

## 2021-09-02 NOTE — ED NOTES
US made aware to please place patient in room 41 when done.       Maryann Bernal, RN  09/02/21 3721

## 2021-09-02 NOTE — ED PROVIDER NOTES
"Time: 4:07 PM EDT  Arrived by: private car  Chief Complaint:   Chief Complaint   Patient presents with   • Testicle Pain     PT STATES THAT HIS TESTICLES WERE SWOLLEN THE SIZE OF A GRAPEFRUIT YESTERDAY      History provided by: Patient  History is limited by: N/A     History of Present Illness:  Patient is a 41 y.o. year old male that presents to the emergency department with complaint of swollen testicles. He states this started yesterday. Denies any injury or pain or difficulty urinating. Patient states his testicles were the size of a \"grapefruit\" last night but that they have reduced in size today.      Similar Symptoms Previously: No  Recently seen: No      Patient Care Team  Primary Care Provider: Provider, Katelynn Known    Past Medical History:     Allergies   Allergen Reactions   • Flexeril [Cyclobenzaprine] Itching     Past Medical History:   Diagnosis Date   • Hypertension      Past Surgical History:   Procedure Laterality Date   • SHOULDER DEBRIDEMENT Left      Family History   Problem Relation Age of Onset   • Cancer Mother         Unspecified   • Diabetes Father         Unspecified type   • Other Other         Abdominal pain, generalized       Home Medications:  Prior to Admission medications    Medication Sig Start Date End Date Taking? Authorizing Provider   carvedilol (COREG) 12.5 MG tablet Take 1 tablet by mouth 2 (Two) Times a Day. 7/16/21   Feliberto Trotter MD   dicyclomine (BENTYL) 20 MG tablet Take 1 tablet by mouth Every 6 (Six) Hours. 8/20/21   Amanda Ariza PA-C   ketorolac (TORADOL) 10 MG tablet Take 1 tablet by mouth Every 6 (Six) Hours As Needed for Moderate Pain . 8/20/21   Amanda Ariza PA-C   losartan (COZAAR) 25 MG tablet 50 mg Daily.    Emergency, Nurse Holger, RN   melatonin 1 MG tablet Take  by mouth.    Provider, MD Nisha   omeprazole (priLOSEC) 20 MG capsule Take 20 mg by mouth Daily.    Provider, MD Nisha   ondansetron ODT (ZOFRAN-ODT) 4 MG disintegrating tablet " "Place 1 tablet under the tongue Every 8 (Eight) Hours As Needed for Nausea or Vomiting. 8/20/21   Amanda Ariza PA-C   Probiotic Product (PROBIOTIC-10 PO) Take  by mouth.    Provider, MD Nisha   traZODone (DESYREL) 50 MG tablet Take 50 mg by mouth Every Night.    Provider, Nisha, MD        Social History:   Social History     Tobacco Use   • Smoking status: Never Smoker   • Smokeless tobacco: Never Used   Vaping Use   • Vaping Use: Never used   Substance Use Topics   • Alcohol use: Yes     Alcohol/week: 2.0 standard drinks     Types: 2 Standard drinks or equivalent per week     Comment: Socially   • Drug use: Never     Recent travel: no     Review of Systems:  Review of Systems   Constitutional: Negative.    HENT: Negative.    Eyes: Negative.    Respiratory: Negative for shortness of breath.    Cardiovascular: Negative for chest pain.   Gastrointestinal: Positive for diarrhea. Negative for abdominal pain, nausea and vomiting.   Endocrine: Negative.    Genitourinary: Positive for scrotal swelling. Negative for difficulty urinating, discharge, dysuria, flank pain, hematuria, penile pain, penile swelling and testicular pain.   Musculoskeletal: Negative.  Negative for neck pain.   Skin: Negative.    Allergic/Immunologic: Negative.    Neurological: Negative.    Hematological: Negative.    Psychiatric/Behavioral: Negative.    All other systems reviewed and are negative.       Physical Exam:  /93   Pulse 93   Temp 99.1 °F (37.3 °C)   Resp 17   Ht 170.2 cm (67\")   Wt 107 kg (235 lb 14.3 oz)   SpO2 96%   BMI 36.95 kg/m²     Physical Exam  Constitutional:       Appearance: Normal appearance.   HENT:      Head: Normocephalic.   Cardiovascular:      Rate and Rhythm: Normal rate and regular rhythm.      Heart sounds: Normal heart sounds.   Pulmonary:      Effort: Pulmonary effort is normal.      Breath sounds: Normal breath sounds.   Abdominal:      General: Abdomen is flat. Bowel sounds are normal.    "   Palpations: Abdomen is soft.      Hernia: There is no hernia in the left inguinal area or right inguinal area.   Genitourinary:     Penis: Normal.       Testes:         Right: Swelling present. Mass or tenderness not present.         Left: Swelling present. Mass or tenderness not present.      Epididymis:      Right: Normal.      Left: Normal.   Lymphadenopathy:      Lower Body: No right inguinal adenopathy. No left inguinal adenopathy.   Neurological:      Mental Status: He is alert.                Medications in the Emergency Department:  Medications - No data to display     Labs  Lab Results (last 24 hours)     Procedure Component Value Units Date/Time    Urinalysis With Culture If Indicated - Urine, Clean Catch [[id number]]  (Abnormal) Collected: 09/02/21 1449    Specimen: Urine, Clean Catch Updated: 09/02/21 1525     Color, UA Dark Yellow     Appearance, UA Clear     pH, UA 7.5     Specific Gravity, UA 1.016     Glucose, UA Negative     Ketones, UA Negative     Bilirubin, UA Negative     Blood, UA Negative     Protein, UA Trace     Leuk Esterase, UA Negative     Nitrite, UA Negative     Urobilinogen, UA 1.0 E.U./dL    Narrative:      Urine microscopic not indicated.    Chlamydia trachomatis, Neisseria gonorrhoeae, PCR - Urine, Urine, Clean Catch [[id number]]  (Normal) Collected: 09/02/21 1449    Specimen: Urine, Clean Catch Updated: 09/02/21 1705     Chlamydia DNA by PCR Not Detected     Neisseria gonorrhoeae by PCR Not Detected           Imaging:  US Scrotum & Testicles    Result Date: 9/2/2021  PROCEDURE: US SCROTUM AND TESTICLES  COMPARISON: [facility], CT, CT ABDOMEN PELVIS W CONTRAST, 8/20/2021, 13:27.  INDICATIONS: swelling  TECHNIQUE: Testicular ultrasound.  FINDINGS:   The right testicle measures 3.7 cm. The left testicle measures 3.2 cm. The testicles have homogeneous echogenicity. There is symmetric flow in the testicles on Doppler imaging. There is no evidence of torsion. There is no  evidence of intratesticular mass or abnormal calcification.  Bilateral varicoceles are present.  There is a small right hydrocele.  There is diffuse scrotal skin thickening.  IMPRESSION: 1. No evidence of intratesticular mass or torsion.  2. Bilateral varicoceles.  Small right hydrocele.  3.  Diffuse scrotal wall thickening may be secondary to edema or cellulitis.  FARSHAD EVANS MD       Electronically Signed and Approved By: FARSHAD EVANS MD on 9/02/2021 at 15:37               Procedures:  Procedures    Progress                            Medical Decision Making:  MDM   41-year-old male patient presented with history of scrotal swelling that started yesterday.  Patient states the amount of swelling is improved today.  He denies any pain or difficulty urinating.  His ultrasound shows no evidence for testicular torsion.  There are varicoceles present bilaterally and a small right-sided hydrocele.  Patient was recently put on Keflex for a urinary tract infection and the patient has close to finishing those antibiotics.  The patient was discussed with the on-call urologist, Dr. Roe, who recommends outpatient follow-up.  The patient is stable for discharge at this time.  Patient also states he has continued to have some diarrhea and is requesting an antidiarrheal medication    Chart review shows the patient was seen on August 20, 2021 by Dr. Jon and diagnosed with a urinary tract infection and placed on 10 days of Keflex.    [place] diagnosis for this patient included but was not limited to testicular torsion, cellulitis, edema, urinary tract infection, hydrocele, varicocele, spermatocele.  Final diagnoses:   Scrotal edema   Diarrhea, unspecified type   Varicocele present on ultrasound of scrotum   Hydrocele, unspecified hydrocele type        Disposition:  ED Disposition     ED Disposition Condition Comment    Discharge Stable           Documentation assistance provided by Elan Cortes DO acting as  scribe for Elan Cortes DO. Information recorded by the scribe was done at my direction and has been verified and validated by me.        Elan Cortes DO  09/02/21 0801

## 2021-09-02 NOTE — ED NOTES
States he's been to ER multiple times this year.  Here 2 weeks ago for side pain which was an UTI.      Maryann Bernal RN  09/02/21 1537

## 2021-11-12 PROBLEM — J96.01 ACUTE RESPIRATORY FAILURE WITH HYPOXIA (HCC): Status: ACTIVE | Noted: 2021-01-01

## 2021-11-12 NOTE — ED PROVIDER NOTES
Time: 2:04 PM EST  Arrived by: Ambulance  Chief Complaint:   Chief Complaint   Patient presents with   • Shortness of Breath     History provided by: Patient  History is limited by: N/A     History of Present Illness:    Andrey Knight is a 41 y.o. male who presents to the emergency department today with complaints of moderate and intermittent shortness of breath. The patient notes that he was seen in September 2021 in the ED for swelling to his scrotum. The patient reports that for the past two months, he has had worsened swelling to his abdomen, legs, and swelling. Two days ago, he states that he became short of breath and \"wheezy.\" He states that \"it got to the point where (he) was shaking like a leaf.\" He notes that he will also become near-syncopal with bending over.    The patient has a medical history of hypertension and enlarged liver. He notes that atrial fibrillation runs in his family on his father's side. He denies smoking or drug use, but does drink alcohol socially. The patient notes that he will \"sometimes\" drink daily. There are no other acute complaints at this time.      History provided by:  Patient   used: No    Shortness of Breath  Severity:  Moderate  Onset quality:  Gradual  Duration:  2 days  Timing:  Intermittent  Progression:  Worsening  Chronicity:  Recurrent  Context comment:  Patient has had generalized swelling for two weeks. Two days ago, he became short of breath with near-syncope when he bends over.  Relieved by:  None tried  Exacerbated by: Near-syncope with bending over.  Ineffective treatments:  None tried  Associated symptoms: wheezing    Associated symptoms: no chest pain, no cough, no fever, no neck pain, no rash and no vomiting    Risk factors: alcohol use (social)    Risk factors: no tobacco use        Similar Symptoms Previously: No.  Recently seen: Patient was seen in the ED on 9/2/2021 with scrotal edema and pain.      Patient Care Team  Primary Care  Provider: Provider, Katelynn Known    Past Medical History:     Allergies   Allergen Reactions   • Flexeril [Cyclobenzaprine] Itching     Past Medical History:   Diagnosis Date   • Hypertension      Past Surgical History:   Procedure Laterality Date   • SHOULDER DEBRIDEMENT Left      Family History   Problem Relation Age of Onset   • Cancer Mother         Unspecified   • Diabetes Father         Unspecified type   • Other Other         Abdominal pain, generalized       Home Medications:  Prior to Admission medications    Medication Sig Start Date End Date Taking? Authorizing Provider   carvedilol (COREG) 12.5 MG tablet Take 1 tablet by mouth 2 (Two) Times a Day. 7/16/21   Feliberto Trotter MD   dicyclomine (BENTYL) 20 MG tablet Take 1 tablet by mouth Every 6 (Six) Hours. 8/20/21   Amanda Ariza PA-C   ketorolac (TORADOL) 10 MG tablet Take 1 tablet by mouth Every 6 (Six) Hours As Needed for Moderate Pain . 8/20/21   Amanda Ariza PA-C   losartan (COZAAR) 25 MG tablet 50 mg Daily.    Emergency, Nurse Holger, RN   melatonin 1 MG tablet Take  by mouth.    Provider, MD Nisha   omeprazole (priLOSEC) 20 MG capsule Take 20 mg by mouth Daily.    ProviderNisha MD   ondansetron ODT (ZOFRAN-ODT) 4 MG disintegrating tablet Place 1 tablet under the tongue Every 8 (Eight) Hours As Needed for Nausea or Vomiting. 8/20/21   Amanda Ariza PA-C   Probiotic Product (PROBIOTIC-10 PO) Take  by mouth.    ProviderNisha MD   traZODone (DESYREL) 50 MG tablet Take 50 mg by mouth Every Night.    ProviderNisha MD        Social History:   Social History     Tobacco Use   • Smoking status: Never Smoker   • Smokeless tobacco: Never Used   Vaping Use   • Vaping Use: Never used   Substance Use Topics   • Alcohol use: Yes     Alcohol/week: 2.0 standard drinks     Types: 2 Standard drinks or equivalent per week     Comment: Socially   • Drug use: Never     Recent travel: not applicable     Review of Systems:  Review of  Systems   Constitutional: Negative for chills and fever.   HENT: Negative for nosebleeds.    Eyes: Negative for redness.   Respiratory: Positive for shortness of breath and wheezing. Negative for cough.    Cardiovascular: Positive for leg swelling. Negative for chest pain.   Gastrointestinal: Negative for diarrhea and vomiting.        Abdominal swelling.   Genitourinary: Positive for scrotal swelling. Negative for dysuria and frequency.   Musculoskeletal: Negative for back pain and neck pain.   Skin: Negative for rash.   Neurological: Positive for light-headedness (with bending over). Negative for seizures.        Generalized shaking.   All other systems reviewed and are negative.       Physical Exam:  /72 (BP Location: Left arm, Patient Position: Lying)   Pulse 105   Temp 99 °F (37.2 °C) (Oral)   Resp 20   Ht 170.2 cm (67\")   Wt 121 kg (267 lb 6.7 oz)   SpO2 94%   BMI 41.88 kg/m²     Physical Exam  Vitals and nursing note reviewed.   Constitutional:       General: He is not in acute distress.     Appearance: He is obese.      Comments: Patient has anasarca.   HENT:      Head: Normocephalic and atraumatic.      Nose: Nose normal.      Mouth/Throat:      Mouth: Mucous membranes are moist.   Eyes:      General: Scleral icterus present.   Cardiovascular:      Rate and Rhythm: Normal rate and regular rhythm.      Heart sounds: Normal heart sounds. No murmur heard.      Pulmonary:      Effort: Tachypnea present.      Breath sounds: Normal breath sounds.      Comments: Patient has conversational dyspnea.  Abdominal:      Palpations: Abdomen is soft.      Tenderness: There is no abdominal tenderness.   Genitourinary:     Testes:         Right: Swelling present.         Left: Swelling present.      Comments: Diffuse scrotal swelling.  Musculoskeletal:         General: No tenderness. Normal range of motion.      Cervical back: Normal range of motion and neck supple.      Right lower leg: No edema.      Left  lower leg: No edema.   Skin:     General: Skin is warm and dry.   Neurological:      Mental Status: He is alert. Mental status is at baseline.   Psychiatric:         Behavior: Behavior normal.                Medications in the Emergency Department:  Medications   sodium chloride 0.9 % flush 10 mL (has no administration in time range)   sodium chloride 0.9 % flush 10 mL (has no administration in time range)   sodium chloride 0.9 % flush 10 mL (has no administration in time range)   sodium chloride 0.9 % flush 10 mL (has no administration in time range)   acetaminophen (TYLENOL) tablet 650 mg (650 mg Oral Given 11/12/21 1914)   calcium carbonate (TUMS) chewable tablet 500 mg (200 mg elemental) (has no administration in time range)   sennosides-docusate (PERICOLACE) 8.6-50 MG per tablet 2 tablet (2 tablets Oral Given 11/12/21 2107)     And   polyethylene glycol (MIRALAX) packet 17 g (has no administration in time range)     And   bisacodyl (DULCOLAX) EC tablet 5 mg (has no administration in time range)     And   bisacodyl (DULCOLAX) suppository 10 mg (has no administration in time range)   ondansetron (ZOFRAN) injection 4 mg (has no administration in time range)   melatonin tablet 5 mg (5 mg Oral Given 11/12/21 2106)   enoxaparin (LOVENOX) syringe 40 mg (40 mg Subcutaneous Given 11/12/21 2107)   furosemide (LASIX) injection 40 mg (has no administration in time range)   LORazepam (ATIVAN) tablet 1 mg (has no administration in time range)     Or   LORazepam (ATIVAN) injection 1 mg (has no administration in time range)     Or   LORazepam (ATIVAN) tablet 2 mg (has no administration in time range)     Or   LORazepam (ATIVAN) injection 2 mg (has no administration in time range)     Or   LORazepam (ATIVAN) injection 2 mg (has no administration in time range)   multivitamin with minerals 1 tablet (1 tablet Oral Given 11/12/21 1902)   folic acid (FOLVITE) tablet 500 mcg (500 mcg Oral Given 11/12/21 1902)   thiamine (VITAMIN  B-1) tablet 100 mg (100 mg Oral Given 11/12/21 1901)   Pharmacy to Dose Cefepime (has no administration in time range)   Pharmacy to dose vancomycin (has no administration in time range)   cefepime (MAXIPIME) IVPB 2 g (premix) in 0.9% NaCl (has no administration in time range)   vancomycin 1500 mg/250 mL 0.9% NS IVPB ([facility]) (has no administration in time range)   cefepime (MAXIPIME) IVPB 2 g (premix) in 0.9% NaCl (2 g Intravenous New Bag 11/12/21 2138)   furosemide (LASIX) injection 40 mg (40 mg Intravenous Given 11/12/21 1330)   cefTRIAXone (ROCEPHIN) IVPB 2 g (0 g Intravenous Stopped 11/12/21 1624)   AZITHROMYCIN 500 MG/250 ML 0.9% NS IVPB (vial-mate) (500 mg Intravenous New Bag 11/12/21 1624)   vancomycin 2500 mg/500 mL 0.9% NS IVPB ([facility]) (2,500 mg Intravenous New Bag 11/12/21 1902)        Labs  Labs Reviewed   COMPREHENSIVE METABOLIC PANEL - Abnormal; Notable for the following components:       Result Value    Glucose 145 (*)     Creatinine 0.57 (*)     Sodium 134 (*)     Chloride 97 (*)     Calcium 8.0 (*)     Albumin 2.80 (*)     AST (SGOT) 207 (*)     Alkaline Phosphatase 305 (*)     Total Bilirubin 5.3 (*)     All other components within normal limits    Narrative:     GFR Normal >60  Chronic Kidney Disease <60  Kidney Failure <15     BNP (IN-HOUSE) - Abnormal; Notable for the following components:    proBNP 555.8 (*)     All other components within normal limits    Narrative:     Among patients with dyspnea, NT-proBNP is highly sensitive for the detection of acute congestive heart failure. In addition NT-proBNP of <300 pg/ml effectively rules out acute congestive heart failure with 99% negative predictive value.    Results may be falsely decreased if patient taking Biotin.     CBC WITH AUTO DIFFERENTIAL - Abnormal; Notable for the following components:    RBC 2.65 (*)     Hemoglobin 9.2 (*)     Hematocrit 25.6 (*)     MCH 34.7 (*)     MCHC 35.9 (*)     RDW 23.9 (*)     RDW-SD 83.1 (*)     Platelets 102 (*)      All other components within normal limits   LACTIC ACID, PLASMA - Abnormal; Notable for the following components:    Lactate 2.3 (*)     All other components within normal limits   MANUAL DIFFERENTIAL - Abnormal; Notable for the following components:    Neutrophil % 87.0 (*)     Lymphocyte % 2.0 (*)     Monocyte % 1.0 (*)     Bands %  6.0 (*)     Metamyelocyte % 3.0 (*)     Myelocyte % 1.0 (*)     Neutrophils Absolute 8.84 (*)     Lymphocytes Absolute 0.19 (*)     All other components within normal limits   LACTIC ACID, REFLEX - Abnormal; Notable for the following components:    Lactate 2.6 (*)     All other components within normal limits   PROTIME-INR - Abnormal; Notable for the following components:    Protime 14.1 (*)     INR 1.41 (*)     All other components within normal limits    Narrative:     Suggested Therapeutic Ranges For Oral Anticoagulant Therapy:  Level of Therapy                      INR Target Range  Standard Dose                            2.0-3.0  High Dose                                2.5-3.5  Patients not receiving anticoagulant  Therapy Normal Range                     0.6-1.2   COVID-19,CEPHEID/ROQUE/BDMAX,COR/BETTY/PAD/YUNG IN-HOUSE,NP SWAB IN TRANSPORT MEDIA 3-4 HR TAT, RT-PCR - Normal    Narrative:     Fact sheet for providers: https://www.fda.gov/media/559511/download     Fact sheet for patients: https://www.fda.gov/media/776121/download  Presumptive Positive: additional testing may be indicated if it is necessary to differentiate between SARS-CoV-2 and other Sarbecovirus, for epidemiological purposes, or clinical management.   MRSA SCREEN, PCR - Normal   TROPONIN (IN-HOUSE) - Normal    Narrative:     Troponin T Reference Range:  <= 0.03 ng/mL-   Negative for AMI  >0.03 ng/mL-     Abnormal for myocardial necrosis.  Clinicians would have to utilize clinical acumen, EKG, Troponin and serial changes to determine if it is an Acute Myocardial Infarction or myocardial injury due to an  underlying chronic condition.       Results may be falsely decreased if patient taking Biotin.     AMMONIA - Normal   PROCALCITONIN - Normal    Narrative:     As a Marker for Sepsis (Non-Neonates):     1. <0.5 ng/mL represents a low risk of severe sepsis and/or septic shock.  2. >2 ng/mL represents a high risk of severe sepsis and/or septic shock.    As a Marker for Lower Respiratory Tract Infections that require antibiotic therapy:  PCT on Admission     Antibiotic Therapy             6-12 Hrs later  >0.5                          Strongly Recommended            >0.25 - <0.5             Recommended  0.1 - 0.25                  Discouraged                       Remeasure/reassess PCT  <0.1                         Strongly Discouraged         Remeasure/reassess PCT      As 28 day mortality risk marker: \"Change in Procalcitonin Result\" (>80% or <=80%) if Day 0 (or Day 1) and Day 4 values are available. Refer to http://www.Sodraftpct-calculator.com    Change in PCT <=80%   A decrease of PCT levels below or equal to 80% defines a positive change in PCT test result representing a higher risk for 28-day all-cause mortality of patients diagnosed with severe sepsis or septic shock.    Change in PCT >80%   A decrease of PCT levels of more than 80% defines a negative change in PCT result representing a lower risk for 28-day all-cause mortality of patients diagnosed with severe sepsis or septic shock.    This test is Prognostic not Diagnostic, if elevated correlate with clinical findings before administering antibiotic treatment.       BLOOD CULTURE   BLOOD CULTURE   BODY FLUID CULTURE   RESPIRATORY CULTURE   LEGIONELLA ANTIGEN, URINE   STREP PNEUMO AG, URINE OR CSF   RAINBOW DRAW    Narrative:     The following orders were created for panel order [place] Draw.  Procedure                               Abnormality         Status                     ---------                               -----------         ------                      Green Top (Gel)[[id number]]                                  Final result               Lavender Top[[id number]]                                     Final result               Gold Top - SST[[id number]]                                   Final result               Light Blue Top[[id number]]                                   Final result                 Please view results for these tests on the individual orders.   BODY FLUID CELL COUNT WITH DIFFERENTIAL    Narrative:     The following orders were created for panel order Body Fluid Cell Count With Differential - Body Fluid, Peritoneum.  Procedure                               Abnormality         Status                     ---------                               -----------         ------                     Body fluid cell count - ...[[id number]]                                                   Please view results for these tests on the individual orders.   ALBUMIN, FLUID   PROTEIN, BODY FLUID   BODY FLUID CELL COUNT   URINALYSIS W/ CULTURE IF INDICATED   CBC AND DIFFERENTIAL    Narrative:     The following orders were created for panel order CBC & Differential.  Procedure                               Abnormality         Status                     ---------                               -----------         ------                     CBC Auto Differential[[id number]]        Abnormal            Final result               Scan Slide[[id number]]                                                                    Please view results for these tests on the individual orders.   GREEN TOP   LAVENDER TOP   GOLD TOP - SST   LIGHT BLUE TOP        Imaging:  CT Abdomen Pelvis Without Contrast    Result Date: 11/12/2021  PROCEDURE: CT ABDOMEN PELVIS WO CONTRAST  COMPARISONS: [facility], CR, XR CHEST 1 VW, 11/12/2021, 13:22.   [facility], CT, CT ABD-PELVIS W CONTRAST, 8/20/2021, 13:27.  INDICATIONS: ABDOMINAL PAIN, ACUTE, NONLOCALIZED.  TECHNIQUE: 641 CT  images were created without intravenous or oral contrast.   PROTOCOL:   Standard imaging protocol performed    RADIATION:   DLP: 615 mGy*cm   Automated exposure control was utilized to minimize radiation dose.  FINDINGS: There is a small right pleural effusion.  Minimal, if any, left pleural effusion is seen.  New infiltrates are seen within the right lung base, especially within the inferior right upper lobe.  These findings are new since the prior 8/20/2021 abdominal CT study.  They were seen on the prior chest radiograph from earlier during 11/12/2021.  The findings may represent infectious multifocal pneumonia.  Aspiration pneumonia cannot be excluded.  Patchy alveolar infiltrates are seen with air bronchograms.  To a lesser extent, there may be infiltrates in the left lung base.  Mild cardiac enlargement is seen.  A tiny hiatal hernia is seen.  There may be paraesophageal varices.  There is diffuse hepatic steatosis with hepatomegaly.  There is also splenomegaly.  The maximum craniocaudal dimension of the right lobe of the liver is at least 27.2 cm.  The maximum craniocaudal extent of the spleen is approximately 17.4 cm.  Portal hypertension and portosystemic venous shunting are suggested although not as well evaluated by nonenhanced CT.  There is a thickened appearance of the colonic wall, especially involving the right colon, which may represent an age-indeterminate (but probably chronic) portal hypertensive colopathy.  Inflammatory stranding is seen throughout the mesentery and within the right paracolic gutter (in particular) and may be related to the portal hypertension.  An infectious/inflammatory colitis cannot be excluded entirely.  However, somewhat similar findings were seen on the 8/20/2021 CT exam.  No definite pneumatosis.  No portal or mesenteric venous gas is seen.  No pneumoperitoneum.  The appendix is seen on image 117 of series 207 and image 99 of series 3. No acute appendicitis.  Small  nodular densities are seen within the mesenteric fat, especially in the right lower quadrant, and may be related to portosystemic venous shunting and/or small lymph nodes.  The gallbladder is mildly distended with sludge.  No gallstones.  No acute cholecystitis or pancreatitis.  No hydronephrosis or obstructive uropathy due to a ureteral calculus.  No definite nonobstructing nephrolithiasis.  No mechanical bowel obstruction.  No definite acute diverticulitis.  There are scattered colonic diverticula.  Pelvic phleboliths are seen.  The urinary bladder is underdistended, which limits its assessment.  A small amount of ascites is present.  The CT number for the ascites is near 0 Hounsfield units.  No acute intraperitoneal or retroperitoneal hemorrhage.  No aneurysmal dilatation of the aortoiliac arterial system.  Probably no prostatic enlargement.  There may be a right-sided varicocele and/or portosystemic shunting involving the right spermatic cord extending into the right scrotal sac.  No definite left-sided varicocele is suggested.  No adrenal mass is seen.  Degenerative changes involve the imaged spine.  No acute fracture or aggressive osseous lesion is seen.  Some degree of anasarca is possible.  No definite focal sizable (drainable) fluid collection is seen within the abdominal or pelvic wall.  No definite subcutaneous emphysema.  CONCLUSION:   1. There is a new small right pleural effusion.   2. Bilateral pulmonary infiltrates are seen, greater on the right than the left.  The findings may represent infectious multifocal pneumonia.  Aspiration pneumonia cannot be excluded.    3. There is hepatomegaly with diffuse hepatic steatosis.  There is splenomegaly.  Portal hypertension and portosystemic venous shunting are suspected.   4. There may be hypertensive portal colopathy, especially involving the right colon.  An infectious/inflammatory colitis cannot be excluded entirely.  Inflammatory stranding is seen  throughout the mesentery and especially the right paracolic gutter and (again) may be related to portal hypertension.  No pneumoperitoneum or pneumatosis.  No portal or mesenteric venous gas is seen.  Therefore, schema colitis is thought to be unlikely.  No mechanical bowel obstruction is seen.  No acute diverticulitis is identified.  No acute appendicitis.   5. No acute pancreatitis.  No gallstones or acute cholecystitis.  There may be gallbladder sludge.    6. No hydronephrosis or obstructive uropathy due to a ureteral calculus.  The urinary bladder is underdistended, limiting its assessment.   7. There may be a right-sided varicocele.   8. Please see above comments for further detail.     ABIGAIL PARR JR, MD       Electronically Signed and Approved By: ABIGAIL PARR JR, MD on 11/12/2021 at 21:05             XR Chest 1 View    Result Date: 11/12/2021  PROCEDURE: XR CHEST 1 VW  COMPARISON: [facility], CR, CHEST AP/PA 1 VIEW, 4/06/2021, 13:36.  INDICATIONS: SOA Triage Protocol  FINDINGS:  There is new patchy bilateral airspace disease concerning for multifocal pneumonia.  Cardiac silhouette is unchanged.  There is trace right pleural effusion.  No pneumothorax.  No acute osseous abnormality.  CONCLUSION: Multifocal pneumonia with small right pleural effusion versus pulmonary edema.       PHIL DENNIS MD       Electronically Signed and Approved By: PHIL DENNIS MD on 11/12/2021 at 13:41             US Abdomen Limited    Result Date: 11/12/2021  PROCEDURE: US ABDOMEN LIMITED  COMPARISON:  INDICATIONS: ascites. NO FLUID FOR PARACENTESIS.  TECHNIQUE: A limited ultrasound examination of the abdomen was performed.   FINDINGS:  Ultrasound prior to possible paracentesis.  No significant ascites seen.  There may be a trace amount of perihepatic ascites.  CONCLUSION:  1. Inadequate fluid for safe paracentesis at this time.     TRINH CHISHOLM MD       Electronically Signed and Approved By: TRINH  MD PHAN on 11/12/2021 at 17:24               Procedures:  Procedures    Progress  ED Course as of 11/12/21 2156   Fri Nov 12, 2021   1444 We do not believe the patient is a candidate for the full sepsis fluid bolus because of his blood pressures, his lactic acid, and his acute heart failure. [RF]   1521 Patient's saturations dropped to 87% with ambulation. [RF]   1536 Discussed case in detail with Dr. Whipple, who will accept the patient. [RF]      ED Course User Index  [RF] Germaine Leong                            Medical Decision Making:  MDM  Number of Diagnoses or Management Options     Amount and/or Complexity of Data Reviewed  Clinical lab tests: reviewed  Tests in the radiology section of CPT®: reviewed  Tests in the medicine section of CPT®: reviewed  Decide to obtain previous medical records or to obtain history from someone other than the patient: yes  Obtain history from someone other than the patient: yes (Family member by phone.)  Review and summarize past medical records: yes (Patient was seen September 2nd in the ED for scrotal edema. There was a past office visit from July with cardiomyopathy and hypertension. Review of the patient's home mediations does not show any diuretics.)  Discuss the patient with other providers: yes (Admitting physician.)       Differential diagnosis includes community-acquired pneumonia, heart failure, Covid pneumonia, and anasarca from cirrhosis among others.    Pulse oximetry interpretation: 94% SPO2 on room air at rest.  Normal.    Cardiac monitor interpretation: Sinus tachycardia.  Rate 105.    The patient's work-up indicates a metabolic panel with transaminitis AST predominant, normal troponin, elevated BNP, and CBC showing moderate chronic anemia without leukocytosis and mild to moderate thrombocytopenia.  The patient's chest x-ray is concerning for multifocal pneumonia.  Covid test is negative.    On reevaluation the patient is resting and not in respiratory  distress.  Agreeable to admission.  Final diagnoses:   Acute respiratory failure with hypoxia (HCC)   Multifocal pneumonia   Anasarca   Liver disease   Alcoholism (HCC)        Disposition:  ED Disposition     ED Disposition Condition Comment    Decision to Admit  Level of Care: Telemetry [5]   Diagnosis: Acute respiratory failure with hypoxia (HCC) [589587]   Certification: I Certify That Inpatient Hospital Services Are Medically Necessary For Greater Than 2 Midnights            Documentation assistance provided by Germaine Leong acting as scribe for Dr. Fish Balderas. Information recorded by the scribe was done at my direction and has been verified and validated by me.        Germaine Leong  11/12/21 1325       Germaine Leong  11/12/21 1411       Germaine Leong  11/12/21 1435       Germaine Leong  11/12/21 1444       Germaine Leong  11/12/21 1537       Fish Balderas DO  11/12/21 0280

## 2021-11-12 NOTE — PLAN OF CARE
Goal Outcome Evaluation:  Plan of Care Reviewed With: patient           Outcome Summary: pt new admit

## 2021-11-12 NOTE — H&P
[facility]   HOSPITALIST HISTORY AND PHYSICAL  Date: 2021   Patient Name: Andrey Knight  : 1980  MRN: [id number]  Primary Care Physician:  Provider, No Known  Date of admission: 2021    Subjective   Subjective     Chief Complaint: Shortness of breath    HPI:    Andrey Knight is a 41 y.o. male with PMH chronic combined systolic and diastolic congestive heart failure, hypertension, chronic macrocytic anemia, alcohol abuse who presented to the ED on  due to 2 months of worsening shortness of air, abdominal distention, lower extremity swelling.  The patient states his symptoms began 2 months ago, but acutely worsened the last 2 weeks.  He had worsening lower extremity swelling, abdominal and scrotal swelling, and shortness of breath especially with exertion.  He also has developed a productive cough and fever over the last 48 hours.  States his temperature was 103 yesterday, he took Tylenol and Motrin with some benefit.  He has not been vaccinated for Covid but denies any recent exposures or sick contacts, denies recent travel.  On arrival to the ED, he was found to be hypoxic requiring supplemental oxygen, bilirubin elevated at 5.3, , alk phos 305.  Chest x-ray concerning for multifocal pneumonia.  Due to concern for pneumonia, hypoxia, and decompensated cirrhosis, he is admitted for further care.    Personal History     Past Medical History:  Chronic combined systolic and diastolic congestive heart failure  Hypertension  Chronic macrocytic anemia  Alcohol abuse    Past Surgical History:  Cyst removal, arm surgery after a spider bite    Family History:   His father had A. fib, his aunt had pancreatic cancer    Social History:   Lives alone.  Was a previous smoker for a few years but quit at the age of 16.  States he drinks most days, but thinks he only drinks about 7 or 8 drinks weekly.  However, he has told other individuals he drinks daily.  He states his last drink was 4 days ago.   When asked what he drinks, he states \"everything\"    Home Medications:  Probiotic Product, carvedilol, dicyclomine, ketorolac, losartan, melatonin, omeprazole, ondansetron ODT, and traZODone    Allergies:  Allergies   Allergen Reactions   • Flexeril [Cyclobenzaprine] Itching       Review of Systems   A 14 point review of systems was obtained and otherwise negative unless stated in the HPI    Objective   Objective     Vitals:   Temp:  [98.6 °F (37 °C)] 98.6 °F (37 °C)  Heart Rate:  [106-110] 110  Resp:  [28] 28  BP: (137-149)/() 137/77  Flow (L/min):  [2] 2    Physical Exam    Constitutional: Anxious appearing, awake, alert, no acute distress   Eyes: Pupils equal, sclerae icteric bilaterally, no conjunctival injection   HENT: NCAT, mucous membranes moist   Neck: Supple, no thyromegaly, no lymphadenopathy, trachea midline   Respiratory: Diminished breath sounds in bilateral bases, diffuse rhonchi, nasal cannula in place, nonlabored respirations    Cardiovascular: Tachycardic but regular, no murmurs, rubs, or gallops, palpable pedal pulses bilaterally   Gastrointestinal: Abdomen tense, distended, slight erythema of abdomen positive bowel sounds, nontender    Musculoskeletal: No bilateral ankle edema, no clubbing or cyanosis to extremities   Psychiatric: Appropriate affect, cooperative   Neurologic: Oriented x 3, strength symmetric in all extremities, Cranial Nerves grossly intact to confrontation, speech clear, no asterixis but upper extremities shaky   Skin: No rashes     Result Review    Result Review:  I have personally reviewed the results from the time of this admission to 11/12/2021 16:09 EST and agree with these findings:  [x]  Laboratory sodium 134, creatinine 0.5, alk phos 305, , bilirubin 5.3  [x]  Microbiology  [x]  Radiology chest x-ray showing diffuse bilateral infiltrates  [x]  EKG/Telemetry telemetry reviewed showing sinus tachycardia  []  Cardiology/Vascular   []  Pathology  []  Old  records  []  Other:    Assessment/Plan   Assessment / Plan     Assessment/Plan:   Concern for new onset decompensated cirrhosis with ascites  Anasarca  Acute on chronic combined diastolic and systolic congestive heart failure with acute exacerbation  Pulmonary edema  Rule out Covid-19  Multifocal community-acquired pneumonia  Acute hypoxemia  Lactic acidosis  Hypertension  Chronic macrocytic anemia  Thrombocytopenia  Alcohol abuse     Admit to the hospital on telemetry for management of the above  Consult IR for possible paracentesis Check cell count, culture, albumin  Start Lasix 40 mg IV twice daily, strict I's and O's, monitor electrolytes closely  Obtain 2D echo  Obtain Covid swab, will keep in enhanced airborne isolation until result returns  Start Rocephin azithromycin due to his concern for community-acquired pneumonia  Check procalcitonin, strep and Legionella antigens, sputum and blood cultures  Start CIWA protocol, Ativan as needed for withdrawal  Start multivitamin, thiamine, folate  Thrombocytopenia likely in setting of alcohol use  Lovenox for DVT prophylaxis  Trend renal function and electrolytes with a.m. CMP  Trend Hgb and WBC with a.m. CBC    Discussed case with: ED physician, bedside RN, interventional radiology    DVT prophylaxis:  Medical DVT prophylaxis orders are present.    CODE STATUS:    Level Of Support Discussed With: Patient  Code Status (Patient has no pulse and is not breathing): CPR (Attempt to Resuscitate)  Medical Interventions (Patient has pulse or is breathing): Full Support      Admission Status:  I believe this patient meets inpatient status.    Electronically signed by Victor Manuel Paz MD, 11/12/21, 4:09 PM EST.

## 2021-11-13 NOTE — NURSING NOTE
Ativan 2 mg given at 1024 on 11/13/21 was given for anxiety/extreme claustrophobia for an MRI not for CIWA protocol, MD aware.

## 2021-11-13 NOTE — PROGRESS NOTES
Pharmacy to Dose Vancomycin Day: 2    Andrey Knight is a 41 y.o.male admitted with suspected pneumonia and cellulitis. Pharmacy has been consulted to dose IV Vancomycin     Consulting Provider: Sarabjit  Clinical Indication: pneumonia and cellulitis  Pertinent Past Medical History: chronic combined systolic and diastolic congestive heart failure, hypertension, chronic macrocytic anemia, and alcohol abuse  Goal -600 mg/L.hr  Duration of therapy: 7 days     170.2 cm (67\")       11/12/21  1309      Weight: 121 kg (267 lb 6.7 oz)         Estimated Creatinine Clearance: 179.8 mL/min (A) (by C-G formula based on SCr of 0.66 mg/dL (L)).  Results from last 7 days   Lab Units 11/13/21  0433 11/12/21  1324   BUN mg/dL 9 8   CREATININE mg/dL 0.66* 0.57*       HD/PD/CRRT?: no    Lab Results   Component Value Date    WBC 9.75 11/13/2021      Temperature    11/13/21 0359 11/13/21 0538 11/13/21 0713   Temp: 98.2 °F (36.8 °C) 98.8 °F (37.1 °C) 98.7 °F (37.1 °C)        Contrast Administered: no    Relevant Micro:   MRSA PCR negative but treating other indication so will follow cultures for de-escalation       Relevant Radiology: multifocal pneumonia with small right pleural effusion (chest x-ray 11/12)    Other Antimicrobial Therapy: cefepime 2g IV q8h    Assessment/Plan  Loading dose: 2500mg IV x1  Regimen: 1500 mg IV every 12 hours  Exposure target: AUC24 (range)400-600 mg/L.hr   Updated predicted parameters per [employer]:  AUC24,ss: 583 mg/L.hr  PAUC*: 96 %  Ctrough,ss: 14.6 mg/L  Pconc*: 27 %  Tox.: 10 %    Note: Current dosing remains appropriate. Consider de-escalation based on MRSA PCR negative result    BMP ordered for 11/14 AM

## 2021-11-13 NOTE — PROGRESS NOTES
[facility]   Hospitalist Progress Note  Date: 2021  Patient Name: Andrey Knight  : 1980  MRN: [id number]  Date of admission: 2021      Subjective   Subjective     Chief Complaint: Shortness of breath    Summary: 41 y.o. male with PMH chronic combined systolic and diastolic congestive heart failure, hypertension, chronic macrocytic anemia, alcohol abuse who presented to the ED on  due to 2 months of worsening shortness of air, abdominal distention, lower extremity swelling.  The patient states his symptoms began 2 months ago, but acutely worsened the last 2 weeks.  He had worsening lower extremity swelling, abdominal and scrotal swelling, and shortness of breath especially with exertion.  He also has developed a productive cough and fever over the last 48 hours.  States his temperature was 103 yesterday, he took Tylenol and Motrin with some benefit.  He has not been vaccinated for Covid but denies any recent exposures or sick contacts, denies recent travel.  On arrival to the ED, he was found to be hypoxic requiring supplemental oxygen, bilirubin elevated at 5.3, , alk phos 305.  Chest x-ray concerning for multifocal pneumonia.  Due to concern for pneumonia, hypoxia, and decompensated cirrhosis, he is admitted for further care.  Started on broad-spectrum vancomycin and cefepime.  Patient did not have significant enough ascites for paracentesis.  CT abdomen pelvis and MRCP were obtained.  He was started on IV diuresis with improvement.    Interval Followup: No events overnight.  He states he is actually feeling a little better this morning.  Still has intermittent shortness of breath, however, breathing feels better than it did on admission.  Denies chest pain this morning.  Cough has been nonproductive.    Review of Systems   Denies nausea, vomiting, diarrhea, chest pain or palpitations    Objective   Objective     Vitals:   Temp:  [97.7 °F (36.5 °C)-100.7 °F (38.2 °C)] 98.2 °F  (36.8 °C)  Heart Rate:  [] 107  Resp:  [20] 20  BP: (120-153)/(66-86) 128/73  Flow (L/min):  [2-3] 2  Physical Exam    Constitutional: Awake, alert, no acute distress, appears slightly anxious   Eyes: Pupils equal, sclerae anicteric, no conjunctival injection   HENT: NCAT, mucous membranes moist   Neck: Supple, no thyromegaly, no lymphadenopathy, trachea midline   Respiratory: Expiratory wheezing in bilateral bases, otherwise clear, nasal cannula in place, nonlabored respirations    Cardiovascular: RRR, no murmurs, rubs, or gallops   Gastrointestinal: Positive bowel sounds, soft, nontender, nondistended   Musculoskeletal: No bilateral ankle edema, no clubbing or cyanosis to extremities   Psychiatric: Appropriate affect, cooperative   Neurologic: Oriented x 3, strength symmetric in all extremities, Cranial Nerves grossly intact to confrontation, speech clear   Skin: No rashes     Result Review    Result Review:  I have personally reviewed the results from the time of this admission to 11/13/2021 13:43 EST and agree with these findings:  [x]  Laboratory potassium 3.3, magnesium 1.4, procalcitonin 0.2, lactate normal, WBC 9, Hgb 8.9  [x]  Microbiology MRSA nares negative, Covid negative, blood cultures negative  []  Radiology  [x]  EKG/Telemetry   []  Cardiology/Vascular   []  Pathology  []  Old records  []  Other:    Assessment/Plan   Assessment / Plan     Assessment/Plan:  Concern for new onset decompensated cirrhosis with ascites  Hypertensive portal colopathy  Anasarca  Acute on chronic diastolic congestive heart failure with acute exacerbation  Pulmonary edema  Covid-19 ruled out  Multifocal community-acquired pneumonia  Acute hypoxemia  Hypokalemia  Hypomagnesemia   Hypophosphatemia  Refeeding syndrome  Lactic acidosis  Hypertension  Chronic macrocytic anemia  Thrombocytopenia  Alcohol abuse      Continue to monitor telemetry for management of the above  IR unable to safely attempt paracentesis as he did  not have enough ascites  Continue Lasix 40 mg IV twice daily, strict I's and O's, monitor electrolytes closely  2D echo reviewed, normal EF, clinical picture consistent with diastolic CHF  Covid negative, taken out of isolation  Continue broad-spectrum cefepime for now, MRSA nares negative, discontinue vancomycin  Check procalcitonin, strep and Legionella antigens, sputum and blood cultures  Continue CIWA protocol, Ativan as needed for withdrawal  Continue multivitamin, thiamine, folate  Would benefit from EGD to rule out esophageal varices as he has cirrhosis in setting of alcohol abuse  Concern for refeeding syndrome, replacement using folic acid, and phosphorus.  Monitor electrolytes closely  Obtain MRCP to rule out obstructive disease  Lovenox for DVT prophylaxis  Trend renal function and electrolytes with a.m. CMP  Trend Hgb and WBC with a.m. CBC     Discussed case with: Bedside RN    DVT prophylaxis:  Medical DVT prophylaxis orders are present.    CODE STATUS:   Level Of Support Discussed With: Patient  Code Status (Patient has no pulse and is not breathing): CPR (Attempt to Resuscitate)  Medical Interventions (Patient has pulse or is breathing): Full Support      Electronically signed by Victor Manuel Paz MD, 11/13/21, 1:43 PM EST.

## 2021-11-13 NOTE — PLAN OF CARE
Goal Outcome Evaluation:   Pt stable this shift. Continues on CIWA protocol, see flowsheets. Pt had echo and MRI of abdomen done this shift. Pt negative for covid and de-isolated. Had several incontinent episodes due to urinary urgency.         Progress: improving

## 2021-11-14 PROBLEM — D69.6 THROMBOCYTOPENIA (HCC): Status: ACTIVE | Noted: 2021-01-01

## 2021-11-14 PROBLEM — R60.1 ANASARCA: Status: ACTIVE | Noted: 2021-01-01

## 2021-11-14 PROBLEM — K70.10 ALCOHOLIC HEPATITIS: Status: ACTIVE | Noted: 2021-01-01

## 2021-11-14 NOTE — NURSING NOTE
Patient called out stating he was bleeding from rectum around 0025. Upon assessment I found pinpoint area to scrotum that was bleeding heavily. Applied pressure for approximately five minutes before asking Carito RN for assistance. Carito applied pressure to scrotum while I called Dr. Kelly to report bleeding episode. Patient's vital signs were stable. New orders per Dr. Kelly. Will continue to monitor bleeding throughout the night.

## 2021-11-14 NOTE — NURSING NOTE
At approximately 0030 Patient had called out stating that he was bleeding \"pretty bad\". Upon assessment it was noted that he had pinpoint spot on his scrotum that was heavily bleeding. I applied pressure for approximately 30 minutes with minimal change to his bleeding while Juana TINSLEY contacted the on call doctor. Attempted to dress the area with adaptic, guaze and foam tape; after 10 minutes the dressing was saturated. I applied an ABD pad and an Ice pack at 0130. Reassessed the patient at 0145 and the dressing was saturated again, I attempted to apply pressure to only the scrotal skin by pinching the skin for 5 minutes with no change to bleeding. I reapplied a new ABD dressing and ice then contacted our RRT nurse. She suggested using previous methods used and a silver stat dressing. I applied the silver stat at approximately 0215, at this beltran minimal blood was noted on ABD dressing.

## 2021-11-14 NOTE — PLAN OF CARE
Goal Outcome Evaluation:           Progress: improving  Outcome Summary: Patient currently not bleeding from scrotum at this time. Monitoring patient closely throughout shift for increased bleeding. Hemoglobin 8.6. Patient denies pain.

## 2021-11-14 NOTE — CONSULTS
[facility]  Initial Inpatient Consult Note    Referring Provider: Hospitalist    Reason for Consultation: Abnormal liver enzymes, anasarca, thrombocytopenia    Subjective     History of present illness:    41 y.o. male with a history of recent cardiomyopathy, alcohol abuse, who was admitted last week with worsening shortness of breath and anasarca.  Patient admitted to being a daily drinker but is vague in the quantity of alcohol that he does drink.  He had abdominal imaging that showed an enlarged and fatty liver and has been found to have an AST markedly elevated with a normal ALT, prolonged INR of 1.6, and total bilirubin of 6.0.  CT imaging of the abdomen showed hepatomegaly with splenomegaly and portal hypertension and possible portosystemic venous shunting.  There was also hypertensive portal colopathy noted of the right colon.  The patient notes abdominal distention is improved since his admission.  He notes that shortness of breath is also improved.  He reports eating well with no nausea, vomiting, fevers or chills.  He does describe a period of being sober for about 3 years.    Past Medical History:  Past Medical History:   Diagnosis Date   • Hypertension      Past Surgical History:  Past Surgical History:   Procedure Laterality Date   • SHOULDER DEBRIDEMENT Left       Social History:   Social History     Tobacco Use   • Smoking status: Never Smoker   • Smokeless tobacco: Never Used   Substance Use Topics   • Alcohol use: Yes     Alcohol/week: 2.0 standard drinks     Types: 2 Standard drinks or equivalent per week     Comment: Socially      Family History:  Family History   Problem Relation Age of Onset   • Cancer Mother         Unspecified   • Diabetes Father         Unspecified type   • Other Other         Abdominal pain, generalized       Home Meds:  Medications Prior to Admission   Medication Sig Dispense Refill Last Dose   • carvedilol (COREG) 12.5 MG tablet Take 1 tablet by  mouth 2 (Two) Times a Day. 60 tablet 11 Unknown at Unknown time   • melatonin 5 MG tablet tablet Take 5 mg by mouth Every Night.   Unknown at Unknown time   • metoprolol succinate XL (TOPROL-XL) 50 MG 24 hr tablet Take 50 mg by mouth Daily.   Unknown at Unknown time   • Omega-3 Fatty Acids (fish oil) 1000 MG capsule capsule Take 1,000 mg by mouth Every Evening.   Unknown at Unknown time   • omeprazole (priLOSEC) 20 MG capsule Take 20 mg by mouth Daily As Needed.   Unknown at Unknown time   • saccharomyces boulardii (FLORASTOR) 250 MG capsule Take 250 mg by mouth Daily.   Unknown at Unknown time   • traZODone (DESYREL) 50 MG tablet Take 50 mg by mouth Every Night.   Unknown at Unknown time     Current Meds:   arformoterol, 15 mcg, Nebulization, BID - RT  budesonide, 0.5 mg, Nebulization, BID - RT  enoxaparin, 40 mg, Subcutaneous, Q24H  fluticasone, 2 spray, Each Nare, Daily  folic acid, 500 mcg, Oral, Daily  furosemide, 40 mg, Intravenous, Q12H  levoFLOXacin, 750 mg, Oral, Q24H  metoprolol succinate XL, 25 mg, Oral, Q24H  multivitamin with minerals, 1 tablet, Oral, Daily  phenylephrine, 2 spray, Each Nare, TID  potassium & sodium phosphates, 2 packet, Oral, BID AC  senna-docusate sodium, 2 tablet, Oral, BID  sodium chloride, 10 mL, Intravenous, Q12H  sodium chloride, 2 spray, Each Nare, 4x Daily  thiamine, 100 mg, Oral, Daily      Allergies:  Allergies   Allergen Reactions   • Flexeril [Cyclobenzaprine] Itching     Review of Systems  Pertinent items are noted in HPI, all other systems reviewed and negative         Vital Signs  Temp:  [98.1 °F (36.7 °C)-99.6 °F (37.6 °C)] 98.1 °F (36.7 °C)  Heart Rate:  [109-120] 109  Resp:  [22-24] 22  BP: ()/(46-91) 112/61  Physical Exam:  General Appearance:    Alert, cooperative, in no acute distress   Head:    Normocephalic, without obvious abnormality, atraumatic   Eyes:          conjunctivae and sclerae normal, no   icterus   Throat:   no thrush, oral mucosa moist   Neck:    Supple, no adenopathy   Lungs:     Clear to auscultation bilaterally    Heart:    Regular rhythm and normal rate    Chest Wall:    No abnormalities observed   Abdomen:     Soft, nondistended, nontender; normal bowel sounds   Extremities:  2+ bilateral lower extremity edema, generalized anasarca, no redness   Skin:   No bruising or rash   Psychiatric:  normal mood and insight     Results Review:  [x]  Laboratory   [x]  Radiology  []  Pathology      I reviewed the patient's new clinical results.    Results from last 7 days   Lab Units 11/14/21  0200 11/13/21  0433 11/12/21  1324   WBC 10*3/mm3 9.03 9.75 9.51   HEMOGLOBIN g/dL 8.6* 8.9* 9.2*   HEMATOCRIT % 25.0* 24.6* 25.6*   PLATELETS 10*3/mm3 99* 98* 102*     Results from last 7 days   Lab Units 11/14/21  0200 11/13/21  0433 11/12/21  1324   SODIUM mmol/L 137 133* 134*   POTASSIUM mmol/L 3.5 3.3* 3.8   CHLORIDE mmol/L 98 95* 97*   CO2 mmol/L 26.9 27.0 25.8   BUN mg/dL 10 9 8   CREATININE mg/dL 0.68* 0.66* 0.57*   CALCIUM mg/dL 8.0* 8.0* 8.0*   BILIRUBIN mg/dL 6.0* 6.7* 5.3*   ALK PHOS U/L 249* 269* 305*   ALT (SGPT) U/L 21 23 23   AST (SGOT) U/L 173* 189* 207*   GLUCOSE mg/dL 127* 130* 145*     Results from last 7 days   Lab Units 11/14/21  0835 11/13/21  0433 11/12/21  1324   INR  1.60* 1.60* 1.41*     Lab Results   Lab Value Date/Time    LIPASE 29 08/20/2021 1221       Radiology:  MRI abdomen wo contrast mrcp   Final Result      CT Abdomen Pelvis Without Contrast   Final Result      US Abdomen Limited   Final Result      XR Chest 1 View   Final Result           Assessment/Plan     Patient Active Problem List   Diagnosis   • Acute respiratory failure with hypoxia (HCC)   • Alcoholic hepatitis   • Thrombocytopenia (HCC)   • Anasarca        Plan:  Patient has radiographic imaging suggestive of hepatomegaly and thrombocytopenia as well as signs of portal hypertension.  I suspect that these are alcohol driven findings as his AST is markedly elevated, MCV elevated,  however her he may not have underlying cirrhosis given the appearance of his liver is large.  He does not qualify for steroids based on his discriminant function score of 24.  Have counseled him strongly regarding the importance of alcohol cessation and he does appear motivated.  His sister  from cirrhosis related to alcohol abuse.  I think he will need outpatient rehab or AA at a minimum.  In the immediate future I will schedule him for an upper endoscopy tomorrow first to screen for esophageal varices.  Otherwise will continue slow diuresis and encouraged good nutrition.  He is also on thiamine and shows no signs of withdrawal.      I discussed the patients findings and my recommendations with patient.    Darrian Russell MD

## 2021-11-14 NOTE — PROGRESS NOTES
[facility]   Hospitalist Progress Note  Date: 2021  Patient Name: Andrey Knight  : 1980  MRN: [id number]  Date of admission: 2021      Subjective   Subjective     Chief Complaint: Shortness of breath    Summary: 41 y.o. male with PMH chronic combined systolic and diastolic congestive heart failure, hypertension, chronic macrocytic anemia, alcohol abuse who presented to the ED on  due to 2 months of worsening shortness of air, abdominal distention, lower extremity swelling.  The patient states his symptoms began 2 months ago, but acutely worsened the last 2 weeks.  He had worsening lower extremity swelling, abdominal and scrotal swelling, and shortness of breath especially with exertion.  He also has developed a productive cough and fever over the last 48 hours.  States his temperature was 103 yesterday, he took Tylenol and Motrin with some benefit.  He has not been vaccinated for Covid but denies any recent exposures or sick contacts, denies recent travel.  On arrival to the ED, he was found to be hypoxic requiring supplemental oxygen, bilirubin elevated at 5.3, , alk phos 305.  Chest x-ray concerning for multifocal pneumonia.  Due to concern for pneumonia, hypoxia, and decompensated cirrhosis, he is admitted for further care.  Started on broad-spectrum vancomycin and cefepime.  Patient did not have significant enough ascites for paracentesis.  CT abdomen pelvis and MRCP were obtained.  He was started on IV diuresis with improvement.    Interval Followup: Patient had some bleeding from a cut on his scrotum overnight.  He is also had some bleeding in his nose which has since stopped.  He also complains of nasal congestion.  States his breathing is feeling better daily.  Abdominal pain is improving.  Otherwise no new complaints.    Review of Systems   Denies nausea, vomiting, diarrhea, chest pain or palpitations    Objective   Objective     Vitals:   Temp:  [98.1 °F (36.7 °C)-99.6 °F  (37.6 °C)] 98.1 °F (36.7 °C)  Heart Rate:  [109-120] 109  Resp:  [22-24] 22  BP: ()/(46-91) 112/61  Physical Exam    Constitutional: Awake, alert, no acute distress   Eyes: Pupils equal, sclerae anicteric, no conjunctival injection   HENT: NCAT, mucous membranes moist   Neck: Supple, no thyromegaly, no lymphadenopathy, trachea midline   Respiratory: CTAB, no w/r/r, nonlabored respirations    Cardiovascular: RRR, no murmurs, rubs, or gallops   Gastrointestinal: Positive bowel sounds, soft, nontender, nondistended   Musculoskeletal: 2+ pitting edema to the midshin bilaterally, no clubbing or cyanosis to extremities   Psychiatric: Appropriate affect, cooperative   Neurologic: Oriented x 3, strength symmetric in all extremities, Cranial Nerves grossly intact to confrontation, speech clear   Skin: No rashes     Result Review    Result Review:  I have personally reviewed the results from the time of this admission to 11/14/2021 12:32 EST and agree with these findings:  [x]  Laboratory sodium 137, potassium 3.5, creatinine 0.6, hemoglobin 8.6  [x]  Microbiology   []  Radiology  [x]  EKG/Telemetry   []  Cardiology/Vascular   []  Pathology  []  Old records  []  Other:    Assessment/Plan   Assessment / Plan     Assessment/Plan:  Concern for new onset decompensated cirrhosis with ascites  Hypertensive portal colopathy  Anasarca  Acute on chronic diastolic congestive heart failure with acute exacerbation  Pulmonary edema  Covid-19 ruled out  Multifocal community-acquired pneumonia  Acute hypoxemia  Hypokalemia  Hypomagnesemia   Hypophosphatemia  Refeeding syndrome  Lactic acidosis  Hypertension  Chronic macrocytic anemia  Thrombocytopenia  Alcohol abuse      Continue to monitor telemetry for management of the above  Consult gastroenterology for consideration of EGD to rule out EV  Continue Lasix 40 mg IV twice daily, strict I's and O's, monitor electrolytes closely  Transition cefepime to oral Levaquin and treat for total  of 7 days for community-acquired pneumonia  Continue CIWA protocol, Ativan as needed for withdrawal  Continue multivitamin, thiamine, folate  Replace phosphorus IV  MRCP personally reviewed, negative for obstruction or choledocholithiasis  Start nasal sprays  Lovenox for DVT prophylaxis  Trend renal function and electrolytes with a.m. CMP  Trend Hgb and WBC with a.m. CBC     Discussed case with: Bedside RN, gastroenterology    DVT prophylaxis:  Medical DVT prophylaxis orders are present.    CODE STATUS:   Level Of Support Discussed With: Patient  Code Status (Patient has no pulse and is not breathing): CPR (Attempt to Resuscitate)  Medical Interventions (Patient has pulse or is breathing): Full Support      Electronically signed by Victor Manuel Paz MD, 11/14/21, 12:32 PM EST.

## 2021-11-14 NOTE — SIGNIFICANT NOTE
Nursing from 3 called re: bleeding from penis.   Bleeding was initially significant but staff was able to stanch it to the point of just being \"pinpoint\".    Vitals:    11/13/21 1937 11/13/21 1941 11/13/21 2231 11/14/21 0053   BP:   96/46 135/80   BP Location:   Right arm    Patient Position:   Lying    Pulse: 118 116 118 119   Resp: 24 22 22    Temp:   98.8 °F (37.1 °C)    TempSrc:   Oral    SpO2: 91% 91% 90% 90%   Weight:       Height:         Relevant labs from yesterday morning:  -INR 1.60  -Hgb 8.9 (~stable from 9.2 on admission)  -Urinalysis from midday: Unremarkable; no blood    Staff requested stat H&H   -H&H ordered at 1:20 a.m.   [ ] Follow-up H&H   -Have DC'd his Lovenox  [ ] Consider urology consult in the a.m. if still eating        Addendum/follow-up:  Morning labs came back at 2 AM with the H&H  -Hemoglobin 8.6 from 8.9 earlier   -No action needed at this moment can be reassessed by day team  -Critical critical value of phosphorus which appears to be consistently low on this admission of 1.5   - Some IV and Oral repletion ordered   - Day team to trend and manage going forward.

## 2021-11-14 NOTE — H&P (VIEW-ONLY)
[facility]  Initial Inpatient Consult Note    Referring Provider: Hospitalist    Reason for Consultation: Abnormal liver enzymes, anasarca, thrombocytopenia    Subjective     History of present illness:    41 y.o. male with a history of recent cardiomyopathy, alcohol abuse, who was admitted last week with worsening shortness of breath and anasarca.  Patient admitted to being a daily drinker but is vague in the quantity of alcohol that he does drink.  He had abdominal imaging that showed an enlarged and fatty liver and has been found to have an AST markedly elevated with a normal ALT, prolonged INR of 1.6, and total bilirubin of 6.0.  CT imaging of the abdomen showed hepatomegaly with splenomegaly and portal hypertension and possible portosystemic venous shunting.  There was also hypertensive portal colopathy noted of the right colon.  The patient notes abdominal distention is improved since his admission.  He notes that shortness of breath is also improved.  He reports eating well with no nausea, vomiting, fevers or chills.  He does describe a period of being sober for about 3 years.    Past Medical History:  Past Medical History:   Diagnosis Date   • Hypertension      Past Surgical History:  Past Surgical History:   Procedure Laterality Date   • SHOULDER DEBRIDEMENT Left       Social History:   Social History     Tobacco Use   • Smoking status: Never Smoker   • Smokeless tobacco: Never Used   Substance Use Topics   • Alcohol use: Yes     Alcohol/week: 2.0 standard drinks     Types: 2 Standard drinks or equivalent per week     Comment: Socially      Family History:  Family History   Problem Relation Age of Onset   • Cancer Mother         Unspecified   • Diabetes Father         Unspecified type   • Other Other         Abdominal pain, generalized       Home Meds:  Medications Prior to Admission   Medication Sig Dispense Refill Last Dose   • carvedilol (COREG) 12.5 MG tablet Take 1 tablet by  mouth 2 (Two) Times a Day. 60 tablet 11 Unknown at Unknown time   • melatonin 5 MG tablet tablet Take 5 mg by mouth Every Night.   Unknown at Unknown time   • metoprolol succinate XL (TOPROL-XL) 50 MG 24 hr tablet Take 50 mg by mouth Daily.   Unknown at Unknown time   • Omega-3 Fatty Acids (fish oil) 1000 MG capsule capsule Take 1,000 mg by mouth Every Evening.   Unknown at Unknown time   • omeprazole (priLOSEC) 20 MG capsule Take 20 mg by mouth Daily As Needed.   Unknown at Unknown time   • saccharomyces boulardii (FLORASTOR) 250 MG capsule Take 250 mg by mouth Daily.   Unknown at Unknown time   • traZODone (DESYREL) 50 MG tablet Take 50 mg by mouth Every Night.   Unknown at Unknown time     Current Meds:   arformoterol, 15 mcg, Nebulization, BID - RT  budesonide, 0.5 mg, Nebulization, BID - RT  enoxaparin, 40 mg, Subcutaneous, Q24H  fluticasone, 2 spray, Each Nare, Daily  folic acid, 500 mcg, Oral, Daily  furosemide, 40 mg, Intravenous, Q12H  levoFLOXacin, 750 mg, Oral, Q24H  metoprolol succinate XL, 25 mg, Oral, Q24H  multivitamin with minerals, 1 tablet, Oral, Daily  phenylephrine, 2 spray, Each Nare, TID  potassium & sodium phosphates, 2 packet, Oral, BID AC  senna-docusate sodium, 2 tablet, Oral, BID  sodium chloride, 10 mL, Intravenous, Q12H  sodium chloride, 2 spray, Each Nare, 4x Daily  thiamine, 100 mg, Oral, Daily      Allergies:  Allergies   Allergen Reactions   • Flexeril [Cyclobenzaprine] Itching     Review of Systems  Pertinent items are noted in HPI, all other systems reviewed and negative         Vital Signs  Temp:  [98.1 °F (36.7 °C)-99.6 °F (37.6 °C)] 98.1 °F (36.7 °C)  Heart Rate:  [109-120] 109  Resp:  [22-24] 22  BP: ()/(46-91) 112/61  Physical Exam:  General Appearance:    Alert, cooperative, in no acute distress   Head:    Normocephalic, without obvious abnormality, atraumatic   Eyes:          conjunctivae and sclerae normal, no   icterus   Throat:   no thrush, oral mucosa moist   Neck:    Supple, no adenopathy   Lungs:     Clear to auscultation bilaterally    Heart:    Regular rhythm and normal rate    Chest Wall:    No abnormalities observed   Abdomen:     Soft, nondistended, nontender; normal bowel sounds   Extremities:  2+ bilateral lower extremity edema, generalized anasarca, no redness   Skin:   No bruising or rash   Psychiatric:  normal mood and insight     Results Review:  [x]  Laboratory   [x]  Radiology  []  Pathology      I reviewed the patient's new clinical results.    Results from last 7 days   Lab Units 11/14/21  0200 11/13/21  0433 11/12/21  1324   WBC 10*3/mm3 9.03 9.75 9.51   HEMOGLOBIN g/dL 8.6* 8.9* 9.2*   HEMATOCRIT % 25.0* 24.6* 25.6*   PLATELETS 10*3/mm3 99* 98* 102*     Results from last 7 days   Lab Units 11/14/21  0200 11/13/21  0433 11/12/21  1324   SODIUM mmol/L 137 133* 134*   POTASSIUM mmol/L 3.5 3.3* 3.8   CHLORIDE mmol/L 98 95* 97*   CO2 mmol/L 26.9 27.0 25.8   BUN mg/dL 10 9 8   CREATININE mg/dL 0.68* 0.66* 0.57*   CALCIUM mg/dL 8.0* 8.0* 8.0*   BILIRUBIN mg/dL 6.0* 6.7* 5.3*   ALK PHOS U/L 249* 269* 305*   ALT (SGPT) U/L 21 23 23   AST (SGOT) U/L 173* 189* 207*   GLUCOSE mg/dL 127* 130* 145*     Results from last 7 days   Lab Units 11/14/21  0835 11/13/21  0433 11/12/21  1324   INR  1.60* 1.60* 1.41*     Lab Results   Lab Value Date/Time    LIPASE 29 08/20/2021 1221       Radiology:  MRI abdomen wo contrast mrcp   Final Result      CT Abdomen Pelvis Without Contrast   Final Result      US Abdomen Limited   Final Result      XR Chest 1 View   Final Result           Assessment/Plan     Patient Active Problem List   Diagnosis   • Acute respiratory failure with hypoxia (HCC)   • Alcoholic hepatitis   • Thrombocytopenia (HCC)   • Anasarca        Plan:  Patient has radiographic imaging suggestive of hepatomegaly and thrombocytopenia as well as signs of portal hypertension.  I suspect that these are alcohol driven findings as his AST is markedly elevated, MCV elevated,  however her he may not have underlying cirrhosis given the appearance of his liver is large.  He does not qualify for steroids based on his discriminant function score of 24.  Have counseled him strongly regarding the importance of alcohol cessation and he does appear motivated.  His sister  from cirrhosis related to alcohol abuse.  I think he will need outpatient rehab or AA at a minimum.  In the immediate future I will schedule him for an upper endoscopy tomorrow first to screen for esophageal varices.  Otherwise will continue slow diuresis and encouraged good nutrition.  He is also on thiamine and shows no signs of withdrawal.      I discussed the patients findings and my recommendations with patient.    Darrian Russell MD

## 2021-11-15 NOTE — INTERVAL H&P NOTE
H&P reviewed. The patient was examined and there are no changes to the H&P.      Risks and benefits of egd discussed with patient including alternatives and he is willing to proceed.

## 2021-11-15 NOTE — CASE MANAGEMENT/SOCIAL WORK
Discharge Planning Assessment   Dov     Patient Name: Andrey Knight  MRN: [id number]  Today's Date: 11/15/2021    Admit Date: 11/12/2021     Discharge Needs Assessment     Row Name 11/15/21 0953       Living Environment    Lives With alone    Current Living Arrangements home/apartment/condo    Primary Care Provided by [facility]    Quality of Family Relationships supportive    Able to Return to Prior Arrangements yes       Resource/Environmental Concerns    Resource/Environmental Concerns none       Transition Planning    Patient/Family Anticipates Transition to home    Patient/Family Anticipated Services at Transition none    Transportation Anticipated family or friend will provide       Discharge Needs Assessment    Readmission Within the Last 30 Days no previous admission in last 30 days    Concerns to be Addressed denies needs/concerns at this time    Anticipated Changes Related to Illness none    Equipment Needed After Discharge none               Discharge Plan     Row Name 11/15/21 0953       Plan    Plan Met with pt in room, pt denies need for Etoh tx, inpt or Outpt. Pt feels he is not an alcoholic and will cut back on drinking. Pt up in room, steady on feet. Pt lives alone and drives self. Reminded pt  he has an assigned PCP thru [facility] and will have appt made on dc. Provided pt with PVP list if he wants to cahnge PCPs and to contact [facility] to switch. Pt states understanding.    Final Discharge Disposition Code 01 - home or self-care              Continued Care and Services - Admitted Since 11/12/2021    Coordination has not been started for this encounter.          Demographic Summary     Row Name 11/15/21 0951       General Information    Admission Type inpatient    Arrived From home    Preferred Language English               Functional Status     Row Name 11/15/21 0952       Functional Status    Usual Activity Tolerance good    Current Activity Tolerance good       Functional Status, IADL    Medications  independent    Meal Preparation independent    Housekeeping independent    Laundry independent    Shopping independent    IADL Comments Pt is independent and drives       Mental Status    General Appearance WDL WDL       Mental Status Summary    Recent Changes in Mental Status/Cognitive Functioning no changes               Psychosocial    No documentation.                Abuse/Neglect    No documentation.                Legal    No documentation.                Substance Abuse    No documentation.                Patient Forms    No documentation.                   Shari Randhawa RN

## 2021-11-15 NOTE — PROGRESS NOTES
Psychiatric   Hospitalist Progress Note  Date: 11/15/2021  Patient Name: Andrey Knight  : 1980  MRN: [id number]  Date of admission: 2021      Subjective   Subjective     Chief Complaint: Shortness of breath    Summary: 41 y.o. male with PMH chronic combined systolic and diastolic congestive heart failure, hypertension, chronic macrocytic anemia, alcohol abuse who presented to the ED on  due to 2 months of worsening shortness of air, abdominal distention, lower extremity swelling.  The patient states his symptoms began 2 months ago, but acutely worsened the last 2 weeks.  He had worsening lower extremity swelling, abdominal and scrotal swelling, and shortness of breath especially with exertion.  He also has developed a productive cough and fever over the last 48 hours. On arrival to the ED, he was found to be hypoxic requiring supplemental oxygen, bilirubin elevated at 5.3, , alk phos 305.  Chest x-ray concerning for multifocal pneumonia.  Due to concern for pneumonia, hypoxia, and decompensated cirrhosis, he is admitted for further care.  Started on broad-spectrum vancomycin and cefepime.  Infectious work-up largely negative including negative Covid, transition to Levaquin to complete 7 days for community-acquired pneumonia.  Patient did not have significant enough ascites for paracentesis.  CT abdomen pelvis and MRCP were obtained without evidence of obstruction.  He was started on IV diuresis with improvement.  Gastroenterology was consulted, he underwent EGD on 11/15 which showed Grade I varices were found in the lower third of the esophagus as well as portal hypertensive gastropathy.  Likely discharge home in the next 24-48 hours.     Interval Followup: No events overnight.  He still having intermittent epistaxis, but this is significantly improved since last night.  Did have a low-grade fever this morning but no chills overnight.  Appetite has been good.  He does feel that his  scrotal swelling and lower extremity swelling are significantly improved.    Review of Systems   Denies chest pain, shortness of breath, abdominal pain or diarrhea    Objective   Objective     Vitals:   Temp:  [98.4 °F (36.9 °C)-100.8 °F (38.2 °C)] 99.4 °F (37.4 °C)  Heart Rate:  [108-118] 113  Resp:  [18-24] 20  BP: ()/(47-79) 107/67  Physical Exam    Constitutional: Awake, alert, no acute distress, pleasant   Eyes: Pupils equal, sclerae anicteric, no conjunctival injection   HENT: NCAT, mucous membranes moist   Neck: Supple, no thyromegaly, no lymphadenopathy, trachea midline   Respiratory: CTAB, no w/r/r, nonlabored respirations    Cardiovascular: RRR, no murmurs, rubs, or gallops   Gastrointestinal: Positive bowel sounds, soft, nontender, nondistended   Musculoskeletal: 1+ pitting edema to the midshin bilaterally, no clubbing or cyanosis to extremities   Psychiatric: Appropriate affect, cooperative   Neurologic: Oriented x 3, strength symmetric in all extremities, Cranial Nerves grossly intact to confrontation, speech clear   Skin: No rashes     Result Review    Result Review:  I have personally reviewed the results from the time of this admission to 11/15/2021 14:53 EST and agree with these findings:  [x]  Laboratory sodium 135, potassium 3.5, calcium 7.8, hemoglobin 8.1  [x]  Microbiology   []  Radiology  [x]  EKG/Telemetry telemetry reviewed showed normal sinus rhythm  []  Cardiology/Vascular   []  Pathology  []  Old records  []  Other:    Assessment/Plan   Assessment / Plan     Assessment/Plan:  Concern for new onset decompensated cirrhosis   Grade 1 esophageal varices  Portal hypertensive gastropathy  Hypertensive portal colopathy  Anasarca  Acute on chronic diastolic congestive heart failure with acute exacerbation  Pulmonary edema  Covid-19 ruled out  Multifocal community-acquired pneumonia  Acute hypoxemia  Hypokalemia  Hypomagnesemia   Hypophosphatemia  Refeeding syndrome  Lactic  acidosis  Hypertension  Chronic macrocytic anemia  Thrombocytopenia  Alcohol abuse      Continue to monitor on telemetry for management of the above  Gastroenterology consulted, appreciate assistance  EGD performed today which did show grade 1 esophageal varices as well as portal hypertensive gastropathy  Continue Lasix 40 mg IV twice daily, strict I's and O's, monitor electrolytes closely  Will likely transition to oral diuretics at discharge  Continue oral Levaquin, complete 7 days for community-acquired pneumonia  Discontinue CIWA protocol  Continue multivitamin, thiamine, folate  Replace phosphorus IV  Continue with nasal sprays  Continue lower extremity Ace wraps for compression  Lovenox for DVT prophylaxis  Trend renal function and electrolytes with a.m. CMP  Trend Hgb and WBC with a.m. CBC     Discussed case with: Bedside RN, gastroenterology    DVT prophylaxis:  Medical DVT prophylaxis orders are present.    CODE STATUS:   Level Of Support Discussed With: Patient  Code Status (Patient has no pulse and is not breathing): CPR (Attempt to Resuscitate)  Medical Interventions (Patient has pulse or is breathing): Full Support      Electronically signed by Victor Manuel Paz MD, 11/15/21, 2:53 PM EST.

## 2021-11-15 NOTE — ANESTHESIA PREPROCEDURE EVALUATION
Anesthesia Evaluation     Patient summary reviewed and Nursing notes reviewed   no history of anesthetic complications:  NPO Solid Status: > 8 hours  NPO Liquid Status: > 2 hours           Airway   Mallampati: II  TM distance: >3 FB  Neck ROM: full  No difficulty expected  Dental      Pulmonary - negative pulmonary ROS and normal exam    breath sounds clear to auscultation  Cardiovascular - normal exam  Exercise tolerance: good (4-7 METS)    Rhythm: regular    (+) hypertension,       Neuro/Psych- negative ROS  GI/Hepatic/Renal/Endo    (+)   hepatitis (EtOH),   Liver disease: likely cirhosis.    Musculoskeletal     Abdominal    Substance History   (+) alcohol use,      OB/GYN          Other - negative ROS                       Anesthesia Plan    ASA 3     general   total IV anesthesia    Anesthetic plan, all risks, benefits, and alternatives have been provided, discussed and informed consent has been obtained with: patient.

## 2021-11-15 NOTE — ANESTHESIA POSTPROCEDURE EVALUATION
Patient: Andrey Knight    Procedure Summary     Date: 11/15/21 Room / Location: [facility] ENDOSCOPY 2 / [facility] ENDOSCOPY    Anesthesia Start: 1403 Anesthesia Stop: 1439    Procedure: ESOPHAGOGASTRODUODENOSCOPY with biopsy (N/A ) Diagnosis:       Alcoholic hepatitis without ascites      (Alcoholic hepatitis without ascites [K70.10])    Surgeons: Darrian Russell MD Provider: Haris Smith MD    Anesthesia Type: general ASA Status: 3          Anesthesia Type: general    Vitals  Vitals Value Taken Time   /61 11/15/21 1458   Temp 37.2 °C (99 °F) 11/15/21 1458   Pulse 108 11/15/21 1458   Resp 24 11/15/21 1458   SpO2 98 % 11/15/21 1458           Post Anesthesia Care and Evaluation    Patient location during evaluation: bedside  Patient participation: complete - patient participated  Level of consciousness: awake  Pain management: adequate  Airway patency: patent  Anesthetic complications: No anesthetic complications  PONV Status: none  Cardiovascular status: acceptable  Respiratory status: acceptable  Hydration status: acceptable    Comments: An Anesthesiologist personally participated in the most demanding procedures (including induction and emergence if applicable) in the anesthesia plan, monitored the course of anesthesia administration at frequent intervals and remained physically present and available for immediate diagnosis and treatment of emergencies.

## 2021-11-16 NOTE — DISCHARGE SUMMARY
[facility]         HOSPITALIST  DISCHARGE SUMMARY    Patient Name: Andrey Knight  : 1980  MRN: [id number]    Date of Admission: 2021  Date of Discharge:  21  Primary Care Physician: Ernesto Chapman MD    Consultants:  -Gastroenterology: Dr. Darrian Russell    Hospital Problems:  Decompensated cirrhosis of liver due to alcohol  Grade 1 esophageal varices  Portal hypertensive gastropathy  Hypertensive portal colopathy  Anasarca  Acute on chronic diastolic congestive heart failure with acute exacerbation  Pulmonary edema  Multifocal community-acquired pneumonia  Acute hypoxic respiratory failure  Hypokalemia  Hypomagnesemia  Hypophosphatemia  Refeeding syndrome  Lactic acidosis  Hypertension  Chronic macrocytic anemia  Thrombocytopenia  Alcohol abuse    Hospital Course     Hospital Course:  Andrey Knight is a 41 y.o. male with past medical history significant for chronic combined systolic and diastolic congestive heart failure, hypertension, chronic macrocytic anemia, alcohol abuse who presented the ED due to 2 months of worsening shortness of air, abdominal distention and lower extremity swelling.  Patient noted that he had worsening lower extremity swelling, abdominal scrotal swelling and shortness of breath with exertion.  Evaluation in ED significant for patient being hypoxic and requiring supplemental O2 to maintain sats greater than 90%, bilirubin elevated at 5.3, AST: 207, alk phos: 305.  Chest x-ray was concerning for multifocal pneumonia.  Hospitalist service contacted for further evaluation management.  Patient initially started on broad-spectrum antibiotics pending infectious work-up, this was de-escalate to Levaquin at time of discharge with patient to complete 7 total days of antibiotic therapy.  Patient did not have significant enough ascites for paracentesis.  CT abdomen/pelvis and MRCP were obtained without evidence of obstruction.  Patient was started on IV diuresis with  improvement in his symptoms.  Gastroenterology was consulted, patient underwent EGD on 11/15/2021 which showed grade 1 varices in the lower 3rd of the esophagus as well as portal hypertensive gastropathy.  Patient's labs were monitored and remained stable.  Patient hemodynamically stable no additional inpatient evaluation or work-up necessary at this time, patient will discharge home outpatient follow-up with PCP in 3 to 5 days and gastroenterology in 2 weeks.      DISCHARGE Follow Up Recommendations for labs and diagnostics:   -Follow-up with PCP in 3 to 5 days  -Follow-up with gastroenterology in 2 weeks    Day of Discharge     Vital Signs:  Temp:  [98.1 °F (36.7 °C)-99.9 °F (37.7 °C)] 99 °F (37.2 °C)  Heart Rate:  [109-114] 110  Resp:  [16-20] 20  BP: ()/(42-88) 108/88  Flow (L/min):  [2] 2  Physical Exam:   Gen: No acute distress, Conversant, Pleasant, sitting up in chair at bedside  HEENT: MMM, Atraumatic  Neck: Supple, Trachea midline  Resp: CTAB, No w/r/r, No respiratory distress appreciated, equal chest rise bilaterally  Card: Regular rhythm, tachycardic, no m/r/g  Abd: Soft, Nontender, Nondistended, + bowel sounds  Ext: No cyanosis, No clubbing  Neuro: CN II-XII grossly intact, No focal deficits appreciated  Psych: AAO x 3, Normal mood, Normal affect    Discharge Details        Discharge Medications      New Medications      Instructions Start Date   furosemide 40 MG tablet  Commonly known as: Lasix   40 mg, Oral, Daily      levoFLOXacin 750 MG tablet  Commonly known as: LEVAQUIN   750 mg, Oral, Every 24 Hours   Start Date: November 17, 2021     pantoprazole 40 MG EC tablet  Commonly known as: PROTONIX   40 mg, Oral, Every Early Morning   Start Date: November 17, 2021        Changes to Medications      Instructions Start Date   carvedilol 6.25 MG tablet  Commonly known as: Coreg  What changed:   medication strength  how much to take  when to take this   6.25 mg, Oral, 2 Times Daily With Meals          Continue These Medications      Instructions Start Date   fish oil 1000 MG capsule capsule   1,000 mg, Oral, Every Evening      melatonin 5 MG tablet tablet   5 mg, Oral, Nightly      omeprazole 20 MG capsule  Commonly known as: priLOSEC   20 mg, Oral, Daily PRN      saccharomyces boulardii 250 MG capsule  Commonly known as: FLORASTOR   250 mg, Oral, Daily      traZODone 50 MG tablet  Commonly known as: DESYREL   50 mg, Oral, Nightly         Stop These Medications    metoprolol succinate XL 50 MG 24 hr tablet  Commonly known as: TOPROL-XL            Allergies   Allergen Reactions   • Flexeril [Cyclobenzaprine] Itching       Discharge Disposition:  Home or Self Care    Diet:  Hospital:  Diet Order   Procedures   • Diet Regular       Discharge Activity:   Activity Instructions     Activity as Tolerated            CODE STATUS:  Code Status and Medical Interventions:   Ordered at: 11/12/21 1605     Level Of Support Discussed With:    Patient     Code Status (Patient has no pulse and is not breathing):    CPR (Attempt to Resuscitate)     Medical Interventions (Patient has pulse or is breathing):    Full Support       Future Appointments   Date Time Provider Department Center   12/6/2021 10:45 AM Geetha Dunham NP [facility] GE [facility]   12/7/2021  3:45 PM Ernesto Chapman MD [facility] PC [facility]   1/18/2022  2:30 PM [facility]ib[facility] Trotter MD [facility]       Additional Instructions for the Follow-ups that You Need to Schedule     Discharge Follow-up with PCP   As directed       Currently Documented PCP:    Ernesto Chapman MD    PCP Phone Number:    [phone]     Follow Up Details: Follow-up in 3 to 5 days         Discharge Follow-up with Specified Provider: Dr. Darrian Russell; 2 Weeks   As directed      To: Dr. Darrian Russell    Follow Up: 2 Weeks               Pertinent  and/or Most Recent Results     PROCEDURES:   -EGD (11/15/2021)    RADIOLOGY:  MRI abdomen wo contrast mrcp [[id number]] Gerard as Reviewed   Order Status: Completed  Collected: 11/13/21 1252    Updated: 11/13/21 1255   Narrative:     PROCEDURE: MRI ABDOMEN WO CONTRAST MRCP       COMPARISON: None       INDICATIONS: Abdominal pain, biliary obstruction suspected (Ped 0-18y)               TECHNIQUE: A comprehensive examination was performed utilizing a variety of imaging planes and   imaging parameters to optimize visualization of suspected pathology.  Magnetic resonance   cholangiopancreatography was also performed.       FINDINGS:   The examination is nondiagnostic with respect to subtle findings due to claustrophobia and   significant movement despite multiple scanning attempts.  The liver is enlarged measuring up to 26   cm.  There does appear to be some degree of hepatic steatosis.  The spleen is enlarged measuring up   to 16 cm.  There is no biliary distention.  The gallbladder is nondistended.  There are possible   gallstones.  There is a small right-sided pleural effusion.       CONCLUSION:   1. Markedly limited study due to motion and claustrophobia.   2. Hepatosplenomegaly and hepatic steatosis.   3. Small right pleural effusion.   4. Possible gallstones.  No gallbladder or biliary distention.                PHIL DENNIS MD         Electronically Signed and Approved By: PHIL DENNIS MD on 11/13/2021 at 12:52                      CT Abdomen Pelvis Without Contrast [[id number]] Gerard as Reviewed   Order Status: Completed Collected: 11/12/21 2106    Updated: 11/12/21 2109   Narrative:     PROCEDURE: CT ABDOMEN PELVIS WO CONTRAST       COMPARISONS: [facility], CR, XR CHEST 1 VW, 11/12/2021, 13:22.     [facility], CT, CT ABD-PELVIS W CONTRAST, 8/20/2021, 13:27.       INDICATIONS: ABDOMINAL PAIN, ACUTE, NONLOCALIZED.       TECHNIQUE: 641 CT images were created without intravenous or oral contrast.         PROTOCOL:   Standard imaging protocol performed       RADIATION:   DLP: 615 mGy*cm     Automated exposure control was utilized to minimize  radiation dose.       FINDINGS: There is a small right pleural effusion.  Minimal, if any, left pleural effusion is seen.    New infiltrates are seen within the right lung base, especially within the inferior right upper   lobe.  These findings are new since the prior 8/20/2021 abdominal CT study.  They were seen on the   prior chest radiograph from earlier during 11/12/2021.  The findings may represent infectious   multifocal pneumonia.  Aspiration pneumonia cannot be excluded.  Patchy alveolar infiltrates are   seen with air bronchograms.  To a lesser extent, there may be infiltrates in the left lung base.     Mild cardiac enlargement is seen.  A tiny hiatal hernia is seen.  There may be paraesophageal   varices.  There is diffuse hepatic steatosis with hepatomegaly.  There is also splenomegaly.  The   maximum craniocaudal dimension of the right lobe of the liver is at least 27.2 cm.  The maximum   craniocaudal extent of the spleen is approximately 17.4 cm.  Portal hypertension and portosystemic   venous shunting are suggested although not as well evaluated by nonenhanced CT.  There is a   thickened appearance of the colonic wall, especially involving the right colon, which may represent   an age-indeterminate (but probably chronic) portal hypertensive colopathy.  Inflammatory stranding   is seen throughout the mesentery and within the right paracolic gutter (in particular) and may be   related to the portal hypertension.  An infectious/inflammatory colitis cannot be excluded   entirely.  However, somewhat similar findings were seen on the 8/20/2021 CT exam.  No definite   pneumatosis.  No portal or mesenteric venous gas is seen.  No pneumoperitoneum.  The appendix is   seen on image 117 of series 207 and image 99 of series 3. No acute appendicitis.  Small nodular   densities are seen within the mesenteric fat, especially in the right lower quadrant, and may be   related to portosystemic venous shunting and/or  small lymph nodes.  The gallbladder is mildly   distended with sludge.  No gallstones.  No acute cholecystitis or pancreatitis.  No hydronephrosis   or obstructive uropathy due to a ureteral calculus.  No definite nonobstructing nephrolithiasis.     No mechanical bowel obstruction.  No definite acute diverticulitis.  There are scattered colonic   diverticula.  Pelvic phleboliths are seen.  The urinary bladder is underdistended, which limits its   assessment.  A small amount of ascites is present.  The CT number for the ascites is near 0   Hounsfield units.  No acute intraperitoneal or retroperitoneal hemorrhage.  No aneurysmal   dilatation of the aortoiliac arterial system.  Probably no prostatic enlargement.  There may be a   right-sided varicocele and/or portosystemic shunting involving the right spermatic cord extending   into the right scrotal sac.  No definite left-sided varicocele is suggested.  No adrenal mass is   seen.  Degenerative changes involve the imaged spine.  No acute fracture or aggressive osseous   lesion is seen.  Some degree of anasarca is possible.  No definite focal sizable (drainable) fluid   collection is seen within the abdominal or pelvic wall.  No definite subcutaneous emphysema.       CONCLUSION:       1. There is a new small right pleural effusion.         2. Bilateral pulmonary infiltrates are seen, greater on the right than the left.  The findings may   represent infectious multifocal pneumonia.  Aspiration pneumonia cannot be excluded.             3. There is hepatomegaly with diffuse hepatic steatosis.  There is splenomegaly.  Portal   hypertension and portosystemic venous shunting are suspected.         4. There may be hypertensive portal colopathy, especially involving the right colon.  An   infectious/inflammatory colitis cannot be excluded entirely.  Inflammatory stranding is seen   throughout the mesentery and especially the right paracolic gutter and (again) may be related to    portal hypertension.  No pneumoperitoneum or pneumatosis.  No portal or mesenteric venous gas is   seen.  Therefore, schema colitis is thought to be unlikely.  No mechanical bowel obstruction is   seen.  No acute diverticulitis is identified.  No acute appendicitis.         5. No acute pancreatitis.  No gallstones or acute cholecystitis.  There may be gallbladder sludge.             6. No hydronephrosis or obstructive uropathy due to a ureteral calculus.  The urinary bladder is   underdistended, limiting its assessment.         7. There may be a right-sided varicocele.         8. Please see above comments for further detail.                 ABIGAIL PARR JR, MD         Electronically Signed and Approved By: ABIGAIL PARR JR, MD on 11/12/2021 at 21:05                      US Abdomen Limited [[id number]] Gerard as Reviewed   Order Status: Completed Collected: 11/12/21 1725    Updated: 11/12/21 1728   Narrative:     PROCEDURE: US ABDOMEN LIMITED       COMPARISON:       INDICATIONS: ascites. NO FLUID FOR PARACENTESIS.       TECHNIQUE: A limited ultrasound examination of the abdomen was performed.         FINDINGS:   Ultrasound prior to possible paracentesis.  No significant ascites seen.  There may be a trace   amount of perihepatic ascites.       CONCLUSION:   1. Inadequate fluid for safe paracentesis at this time.                TRINH CHISHOLM MD         Electronically Signed and Approved By: TRINH CHISHOLM MD on 11/12/2021 at 17:24                      XR Chest 1 View [[id number]] Gerard as Reviewed   Order Status: Completed Collected: 11/12/21 1341    Updated: 11/12/21 1344   Narrative:     PROCEDURE: XR CHEST 1 VW       COMPARISON: [facility], , CHEST AP/PA 1 VIEW, 4/06/2021, 13:36.       INDICATIONS: SOA Triage Protocol       FINDINGS:   There is new patchy bilateral airspace disease concerning for multifocal pneumonia.  Cardiac   silhouette is unchanged.  There is trace right pleural effusion.   No pneumothorax.  No acute   osseous abnormality.       CONCLUSION: Multifocal pneumonia with small right pleural effusion versus pulmonary edema.                        PHIL DENNIS MD         Electronically Signed and Approved By: PHIL DENNIS MD on 11/12/2021 at 13:41          LAB RESULTS:      Lab 11/16/21  0450 11/15/21  0449 11/14/21  0835 11/14/21  0200 11/13/21  0433 11/12/21  1718 11/12/21  1324   WBC 7.13 8.34  --  9.03 9.75  --  9.51   HEMOGLOBIN 8.3* 8.1*  --  8.6* 8.9*  --  9.2*   HEMATOCRIT 24.3* 22.9*  --  25.0* 24.6*  --  25.6*   PLATELETS 118* 114*  --  99* 98*  --  102*   NEUTROS ABS 4.66 5.46  --  6.66 7.67*  --  8.84*   IMMATURE GRANS (ABS) 0.07* 0.09*  --  0.13* 0.14*  --   --    LYMPHS ABS 0.98 1.11  --  0.88 0.67*  --   --    MONOS ABS 1.24* 1.52*  --  1.22* 1.20*  --   --    EOS ABS 0.13 0.12  --  0.09 0.04  --   --    MCV 98.4* 97.0  --  98.0* 93.2  --  96.6   PROCALCITONIN  --   --   --   --   --   --  0.21   LACTATE  --   --   --   --  1.5 2.6* 2.3*   PROTIME 15.4*  --  15.9*  --  15.9*  --  14.1*         Lab 11/16/21  0450 11/15/21  0449 11/14/21  0200 11/13/21  0433 11/12/21  1324   SODIUM 138 135* 137 133* 134*   POTASSIUM 3.5 3.5 3.5 3.3* 3.8   CHLORIDE 102 98 98 95* 97*   CO2 25.6 26.3 26.9 27.0 25.8   ANION GAP 10.4 10.7 12.1 11.0 11.2   BUN 12 15 10 9 8   CREATININE 0.85 0.93 0.68* 0.66* 0.57*   GLUCOSE 104* 105* 127* 130* 145*   CALCIUM 7.8* 7.8* 8.0* 8.0* 8.0*   MAGNESIUM 1.8 1.8 1.9 1.4*  --    PHOSPHORUS 2.4* 1.9* 1.5* 1.8*  --          Lab 11/16/21  0450 11/15/21  0449 11/14/21  0200 11/13/21  0433 11/12/21  1324   TOTAL PROTEIN 7.9 7.4 7.8 7.9 8.3   ALBUMIN 2.80* 2.80* 2.70* 2.80* 2.80*   GLOBULIN 5.1 4.6 5.1 5.1 5.5   ALT (SGPT) 22 20 21 23 23   AST (SGOT) 160* 141* 173* 189* 207*   BILIRUBIN 5.1* 5.2* 6.0* 6.7* 5.3*   ALK PHOS 230* 225* 249* 269* 305*         Lab 11/16/21 0450 11/14/21  0835 11/13/21  0433 11/12/21  1324   PROBNP  --   --   --  555.8*   TROPONIN T  --    --   --  <0.010   PROTIME 15.4* 15.9* 15.9* 14.1*   INR 1.55* 1.60* 1.60* 1.41*                 Brief Urine Lab Results  (Last result in the past 365 days)      Color   Clarity   Blood   Leuk Est   Nitrite   Protein   CREAT   Urine HCG        11/13/21 1258 Yellow   Clear   Negative   Negative   Negative   Negative               Microbiology Results (last 10 days)     Procedure Component Value - Date/Time    Legionella Antigen, Urine - Urine, Urine, Clean Catch [[id number]]  (Normal) Collected: 11/13/21 1258    Lab Status: Final result Specimen: Urine, Clean Catch Updated: 11/13/21 1413     LEGIONELLA ANTIGEN, URINE Negative    S. Pneumo Ag Urine or CSF - Urine, Urine, Clean Catch [[id number]]  (Normal) Collected: 11/13/21 1258    Lab Status: Final result Specimen: Urine, Clean Catch Updated: 11/13/21 1413     Strep Pneumo Ag Negative    MRSA Screen, PCR (Inpatient) - Swab, Nares [[id number]]  (Normal) Collected: 11/12/21 1835    Lab Status: Final result Specimen: Swab from Nares Updated: 11/12/21 2004     MRSA PCR No MRSA Detected    COVID-19,CEPHEID/ROQUE/BDMAX,COR/BETTY/PAD/YUNG IN-HOUSE(OR EMERGENT/ADD-ON),NP SWAB IN TRANSPORT MEDIA 3-4 HR TAT, RT-PCR - Swab, Nasopharynx [[id number]]  (Normal) Collected: 11/12/21 1725    Lab Status: Final result Specimen: Swab from Nasopharynx Updated: 11/12/21 2101     COVID19 Not Detected    Narrative:      Fact sheet for providers: https://www.fda.gov/media/040260/download     Fact sheet for patients: https://www.fda.gov/media/828819/download  Presumptive Positive: additional testing may be indicated if it is necessary to differentiate between SARS-CoV-2 and other Sarbecovirus, for epidemiological purposes, or clinical management.    Blood Culture - Blood, Arm, Left [[id number]]  (Normal) Collected: 11/12/21 1324    Lab Status: Preliminary result Specimen: Blood from Arm, Left Updated: 11/16/21 1331     Blood Culture No growth at 4 days    Blood Culture - Blood, Arm, Left [[id number]]   (Normal) Collected: 11/12/21 1324    Lab Status: Preliminary result Specimen: Blood from Arm, Left Updated: 11/16/21 1331     Blood Culture No growth at 4 days          Results for orders placed during the hospital encounter of 11/12/21    Adult Transthoracic Echo Complete W/ Cont if Necessary Per Protocol    Interpretation Summary  · Calculated left ventricular EF = 64.6% Estimated left ventricular EF was in agreement with the calculated left ventricular EF.  · The right ventricular cavity is borderline dilated.  · The right atrial cavity is mildly dilated.      Labs Pending at Discharge:  Pending Labs     Order Current Status    Tissue Pathology Exam In process    Blood Culture - Blood, Arm, Left Preliminary result    Blood Culture - Blood, Arm, Left Preliminary result          Time spent on Discharge including face to face service: Greater than 30 minutes    Electronically signed by Jose Escobar MD, 11/16/21, 4:35 PM EST.

## 2021-11-16 NOTE — PLAN OF CARE
Goal Outcome Evaluation:      Patient is stable and free of acute distress. Verbalizes understanding of plan of care and education.

## 2021-11-16 NOTE — OUTREACH NOTE
Prep Survey      Responses   [facility] facility patient discharged from? [facility]   Is LACE score < 7 ? No   Emergency Room discharge w/ pulse ox? No   Eligibility [facility]   Date of Admission 11/12/21   Date of Discharge 11/16/21   Discharge Disposition Home or Self Care   Discharge diagnosis Decompensated cirrhosis of liver due to alcohol  congestive heart failure with acute exacerbation   Does the patient have one of the following disease processes/diagnoses(primary or secondary)? CHF   Does the patient have Home health ordered? No   Is there a DME ordered? No   Prep survey completed? Yes          Maryann Mishra RN

## 2021-11-16 NOTE — SIGNIFICANT NOTE
11/16/21 1508   Plan   Final Discharge Disposition Code 01 - home or self-care   Final Note Pt will be discharging home today. Pt does not have transportation. [facility] scheduled [facility] transportation. Confirmation #: [id number]. [facility] will be to hospital within the next hour to transport and will contact nursing station upon arrival.

## 2021-11-17 NOTE — OUTREACH NOTE
Call Center TCM Note      Responses   [facility] patient discharged from? [facility]   Does the patient have one of the following disease processes/diagnoses(primary or secondary)? CHF   TCM attempt successful? Yes   Call start time 1008   Call end time 1023   Discharge diagnosis Decompensated cirrhosis of liver due to alcohol  congestive heart failure with acute exacerbation   Meds reviewed with patient/caregiver? Yes   Is the patient having any side effects they believe may be caused by any medication additions or changes? No   Does the patient have all medications ordered at discharge? No   Nursing Interventions Nurse provided patient education  [Pt communicated that he has not picked up his prescriptions yet due to insurance issues--this Rn discussed $4 option at  for Lasix and Coreg if he was unable to resolve insurance issues.  He replied that this shouldn't be needed if he has insurance-]   Comments regarding appointments GI appt 12/6/21, Cardiology appt 1/18/22   Does the patient have a primary care provider?  Yes   Does the patient have an appointment with their PCP within 7 days of discharge? Greater than 7 days   Comments regarding PCP Patient has a new PCP appt scheduled for 12/7/21 but exceeds TCM guidelines--will alert for earlier appt if possible   Nursing Interventions Verified appointment date/time/provider   Has the patient kept scheduled appointments due by today? N/A   Has home health visited the patient within 72 hours of discharge? N/A   Pulse Ox monitoring None   Did the patient receive a copy of their discharge instructions? Yes   Nursing interventions Reviewed instructions with patient   What is the patient's perception of their health status since discharge? Improving  [Reports he has improved since hospitalization but still has SOA with exertion and can only walk about 10 feet before he has to stop to catch his breath.  He still has edema to abdomen, feet and ankles--has not picked up  Lasix yet. ]   Nursing interventions Nurse provided patient education  [ Education provided--pt reports he didn't need a scale but a neb machine (noted no neb soln ordered).This RN discussed CHF fluid control and reduction of SOA. Enc'd to follow MD orders, take medications, follow diet and weigh QD. ]   Is the patient weighing daily? No   Does the patient have scales? No  [Encouraged to purchase a scale]   Daily weight interventions Education provided on importance of daily weight  [Reviewed weight gain parameters of 3# day/5 # week require MD notification-encouraged to weigh daily and record to take to MD appts for review---v/u]   Is the patient able to teach back Heart Failure diet management? No   Is the patient able to teach back Heart Failure Zones? No   Is the patient able to teach back signs and symptoms of worsening condition? (i.e. weight gain, shortness of air, etc.) No  [REv'd--needs reinforcement]   Is the patient/caregiver able to teach back the hierarchy of who to call/visit for symptoms/problems? PCP, Specialist, Home health nurse, Urgent Care, ED, 911 Yes   TCM call completed? Yes           Beth Arellano, LAUREANO    11/17/2021, 10:37 EST

## 2021-11-18 NOTE — PROGRESS NOTES
Attempted to call patient, cell phone number does not have voicemail box so unable to leave message, called other number listed and it was not in service.

## 2021-11-19 PROBLEM — N17.9 AKI (ACUTE KIDNEY INJURY) (HCC): Status: ACTIVE | Noted: 2021-01-01

## 2021-11-19 NOTE — OUTREACH NOTE
CHF Week 2 Survey      Responses   [facility] patient discharged from? [facility]   Does the patient have one of the following disease processes/diagnoses(primary or secondary)? CHF   Week 2 attempt successful? No   Revoke Readmitted          Tavia Lamb RN

## 2021-11-20 PROBLEM — N17.9 ACUTE RENAL FAILURE (HCC): Status: ACTIVE | Noted: 2021-01-01

## (undated) DEVICE — Device: Brand: DEFENDO AIR/WATER/SUCTION AND BIOPSY VALVE

## (undated) DEVICE — SINGLE-USE BIOPSY FORCEPS: Brand: RADIAL JAW 4

## (undated) DEVICE — SOL IRRG H2O PL/BG 1000ML STRL

## (undated) DEVICE — EGD OR ERCP KIT: Brand: MEDLINE INDUSTRIES, INC.